# Patient Record
Sex: FEMALE | Race: WHITE | NOT HISPANIC OR LATINO | Employment: UNEMPLOYED
[De-identification: names, ages, dates, MRNs, and addresses within clinical notes are randomized per-mention and may not be internally consistent; named-entity substitution may affect disease eponyms.]

---
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
## 2025-01-31 ENCOUNTER — LAB ENCOUNTER (OUTPATIENT)
Dept: LAB | Age: 20
End: 2025-01-31
Attending: STUDENT IN AN ORGANIZED HEALTH CARE EDUCATION/TRAINING PROGRAM
Payer: COMMERCIAL

## 2025-01-31 ENCOUNTER — OFFICE VISIT (OUTPATIENT)
Dept: INTERNAL MEDICINE CLINIC | Facility: CLINIC | Age: 20
End: 2025-01-31
Payer: COMMERCIAL

## 2025-01-31 VITALS
WEIGHT: 153 LBS | RESPIRATION RATE: 20 BRPM | OXYGEN SATURATION: 97 % | TEMPERATURE: 98 F | HEIGHT: 65 IN | SYSTOLIC BLOOD PRESSURE: 120 MMHG | DIASTOLIC BLOOD PRESSURE: 60 MMHG | HEART RATE: 87 BPM | BODY MASS INDEX: 25.49 KG/M2

## 2025-01-31 DIAGNOSIS — R63.5 WEIGHT GAIN: ICD-10-CM

## 2025-01-31 DIAGNOSIS — E61.2 MAGNESIUM DEFICIENCY: ICD-10-CM

## 2025-01-31 DIAGNOSIS — E61.1 IRON DEFICIENCY: ICD-10-CM

## 2025-01-31 DIAGNOSIS — M79.89 LEG SWELLING: ICD-10-CM

## 2025-01-31 DIAGNOSIS — E55.9 VITAMIN D DEFICIENCY: ICD-10-CM

## 2025-01-31 DIAGNOSIS — E55.9 VITAMIN D DEFICIENCY: Primary | ICD-10-CM

## 2025-01-31 LAB
ALBUMIN SERPL-MCNC: 4.6 G/DL (ref 3.2–4.8)
ALBUMIN/GLOB SERPL: 1.7 {RATIO} (ref 1–2)
ALP LIVER SERPL-CCNC: 71 U/L
ALT SERPL-CCNC: 15 U/L
ANION GAP SERPL CALC-SCNC: 9 MMOL/L (ref 0–18)
AST SERPL-CCNC: 21 U/L (ref ?–34)
BILIRUB SERPL-MCNC: 0.7 MG/DL (ref 0.3–1.2)
BUN BLD-MCNC: 15 MG/DL (ref 9–23)
CALCIUM BLD-MCNC: 9.6 MG/DL (ref 8.7–10.6)
CHLORIDE SERPL-SCNC: 105 MMOL/L (ref 98–112)
CO2 SERPL-SCNC: 26 MMOL/L (ref 21–32)
CREAT BLD-MCNC: 0.87 MG/DL
DEPRECATED HBV CORE AB SER IA-ACNC: 25 NG/ML
EGFRCR SERPLBLD CKD-EPI 2021: 98 ML/MIN/1.73M2 (ref 60–?)
FASTING STATUS PATIENT QL REPORTED: NO
GLOBULIN PLAS-MCNC: 2.7 G/DL (ref 2–3.5)
GLUCOSE BLD-MCNC: 86 MG/DL (ref 70–99)
MAGNESIUM SERPL-MCNC: 2 MG/DL (ref 1.6–2.6)
NT-PROBNP SERPL-MCNC: <35 PG/ML (ref ?–125)
OSMOLALITY SERPL CALC.SUM OF ELEC: 290 MOSM/KG (ref 275–295)
POTASSIUM SERPL-SCNC: 4.3 MMOL/L (ref 3.5–5.1)
PROT SERPL-MCNC: 7.3 G/DL (ref 5.7–8.2)
SODIUM SERPL-SCNC: 140 MMOL/L (ref 136–145)
TSI SER-ACNC: 0.7 UIU/ML (ref 0.48–4.17)
VIT D+METAB SERPL-MCNC: 68.2 NG/ML (ref 30–100)

## 2025-01-31 PROCEDURE — 83735 ASSAY OF MAGNESIUM: CPT

## 2025-01-31 PROCEDURE — 36415 COLL VENOUS BLD VENIPUNCTURE: CPT

## 2025-01-31 PROCEDURE — 82728 ASSAY OF FERRITIN: CPT

## 2025-01-31 PROCEDURE — 83880 ASSAY OF NATRIURETIC PEPTIDE: CPT

## 2025-01-31 PROCEDURE — G2211 COMPLEX E/M VISIT ADD ON: HCPCS | Performed by: STUDENT IN AN ORGANIZED HEALTH CARE EDUCATION/TRAINING PROGRAM

## 2025-01-31 PROCEDURE — 3008F BODY MASS INDEX DOCD: CPT | Performed by: STUDENT IN AN ORGANIZED HEALTH CARE EDUCATION/TRAINING PROGRAM

## 2025-01-31 PROCEDURE — 3074F SYST BP LT 130 MM HG: CPT | Performed by: STUDENT IN AN ORGANIZED HEALTH CARE EDUCATION/TRAINING PROGRAM

## 2025-01-31 PROCEDURE — 99214 OFFICE O/P EST MOD 30 MIN: CPT | Performed by: STUDENT IN AN ORGANIZED HEALTH CARE EDUCATION/TRAINING PROGRAM

## 2025-01-31 PROCEDURE — 80053 COMPREHEN METABOLIC PANEL: CPT

## 2025-01-31 PROCEDURE — 82306 VITAMIN D 25 HYDROXY: CPT

## 2025-01-31 PROCEDURE — 84443 ASSAY THYROID STIM HORMONE: CPT

## 2025-01-31 PROCEDURE — 3078F DIAST BP <80 MM HG: CPT | Performed by: STUDENT IN AN ORGANIZED HEALTH CARE EDUCATION/TRAINING PROGRAM
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
## 2025-02-03 ENCOUNTER — PATIENT MESSAGE (OUTPATIENT)
Dept: INTERNAL MEDICINE CLINIC | Facility: CLINIC | Age: 20
End: 2025-02-03

## 2025-02-03 DIAGNOSIS — D50.9 IRON DEFICIENCY ANEMIA, UNSPECIFIED IRON DEFICIENCY ANEMIA TYPE: Primary | ICD-10-CM

## 2025-02-03 RX ORDER — FERROUS SULFATE 325(65) MG
325 TABLET, DELAYED RELEASE (ENTERIC COATED) ORAL
COMMUNITY

## 2025-03-18 ENCOUNTER — OFFICE VISIT (OUTPATIENT)
Facility: LOCATION | Age: 20
End: 2025-03-18
Payer: COMMERCIAL

## 2025-03-18 VITALS
BODY MASS INDEX: 26.49 KG/M2 | HEIGHT: 65 IN | SYSTOLIC BLOOD PRESSURE: 102 MMHG | DIASTOLIC BLOOD PRESSURE: 60 MMHG | WEIGHT: 159 LBS

## 2025-03-18 DIAGNOSIS — R14.0 BLOATING: Primary | ICD-10-CM

## 2025-03-18 DIAGNOSIS — R79.89 LOW TSH LEVEL: ICD-10-CM

## 2025-03-18 DIAGNOSIS — E07.9 THYROID DISEASE: ICD-10-CM

## 2025-03-18 DIAGNOSIS — R63.5 WEIGHT GAIN: ICD-10-CM

## 2025-03-18 DIAGNOSIS — L30.9 ECZEMA, UNSPECIFIED TYPE: ICD-10-CM

## 2025-03-18 DIAGNOSIS — R41.89 BRAIN FOG: ICD-10-CM

## 2025-03-18 PROCEDURE — 3074F SYST BP LT 130 MM HG: CPT | Performed by: INTERNAL MEDICINE

## 2025-03-18 PROCEDURE — 99214 OFFICE O/P EST MOD 30 MIN: CPT | Performed by: INTERNAL MEDICINE

## 2025-03-18 PROCEDURE — 3078F DIAST BP <80 MM HG: CPT | Performed by: INTERNAL MEDICINE

## 2025-03-18 PROCEDURE — 3008F BODY MASS INDEX DOCD: CPT | Performed by: INTERNAL MEDICINE

## 2025-03-18 NOTE — PATIENT INSTRUCTIONS
8-9 AM labs   24 hr urine collection   How to Collect the 24-hour Urine Specimen  Decide on a day to do the test.  On the day of the test, patient empty bladder (urinate or pee) in the toilet right after waking up. Flush the urine down the toilet.  The test begins now with the bladder empty. Write this date and the start time on the storage container’s label.  For the next 24 hours, patient will need to pee into a collection container every time they go to the bathroom. Females can use a toilet hat. Males can use a plastic urinal or pee right into the large storage container. If you do not have a toilet hat or urinal at home, you may use some other clean plastic container- or get them from labs   Before using the plastic container for the first time, wash it with dish soap and then rinse at least 10 times with tap water. Allow it to air dry completely.  Do not let feces (poop) mix with the urine or else the test will need to be restarted.  Pour the urine into the large storage container and close the lid tightly. Be very careful not to spill any urine.  If using a collection container, rinse it with water only. Put it back by the toilet to remind you to use it the next time. Allow it to air dry completely.  Put the large storage container in the refrigerator ( or in put ice around the container and place in larger container). The urine must be kept cool at all times. If you do not have space in the refrigerator, you can store it in a cooler on top of Add more ice as needed to keep the urine cold.  Each time patient pees during the day and night, follow steps to collect urine.  The next day (close to the same time that you started on the first day), patient needs to pee into the collection container one last time. Add it to the large storage container. This ends the 24-hour collection.  Write the date and time of this last urine collection on the label.  Attach a list of all medicines, including over-the-counter  medicines, vitamins and herbal remedies, patient took during the 24-hour urine collection.    Take the urine to a Laboratory (Lab) Service Center as soon as possible, within 24 hours after ending the collection. Keep the urine cool.  Make sure the urine does not freeze for these tests: amylase, arylsulfatase, immunoelectrophoresis, micro-albumin, pregnanetriol, protein or uric acid.  You will need to start the test over if any of the urine spilled, you forgot to save some or it has poop in it. If you must restart the test, it is okay to use the same collection and storage containers. Pour out the urine, clean the containers well and allow them to air dry. Then follow

## 2025-03-18 NOTE — PROGRESS NOTES
Reason for Visit:    low tsh  Requesting Physician:   ..Sabi Marcus MD    CHIEF COMPLAINT:    Chief Complaint   Patient presents with    Other     Weigh gain        HISTORY OF PRESENT ILLNESS:   Priya Corona is a 19 year old female who presents with  low TSH   She feels more swelling in face, stomach and legs   Stopped olanzapine in Dec 2024 . She gained wt after that.   Wt Readings from Last 6 Encounters:   03/18/25 159 lb (72.1 kg) (87%, Z= 1.13)*   01/31/25 153 lb (69.4 kg) (83%, Z= 0.97)*   10/08/24 120 lb 2.4 oz (54.5 kg) (37%, Z= -0.32)*   08/22/24 121 lb (54.9 kg) (40%, Z= -0.26)*   05/09/24 129 lb 12.8 oz (58.9 kg) (58%, Z= 0.21)*   05/07/24 128 lb (58.1 kg) (55%, Z= 0.13)*     * Growth percentiles are based on CDC (Girls, 2-20 Years) data.     Had sore throat last week   No change in BM     In the last ~ 2 weeks she saw 7 doctors. 2 ER, 3 PCP, ENT, GYN and will see neuro      Has EBV   No skin chagnes   LMP 2/2025 , she is on not on OCP       No umm   Reviewed her outside w/u   Low TSH, low FT3, normal FT4, neg TPO   Alk phos is low   CT normal adrenal.        01/31/25 13:16   TSH  0.701     ASSESSMENT AND PLAN:  19 year old  female who presents with  low TSH  Clinically , nonspecific symptoms - bloating and wt gain   She is concerned she has high cortisol.   She asked about ozempic.   We discussed med side effect    Normal Vit D and TSH on recent labs. We discussed with pt.     Plan   8-9 AM labs   24 hr urine collection   How to Collect the 24-hour Urine Specimen  Decide on a day to do the test.  On the day of the test, patient empty bladder (urinate or pee) in the toilet right after waking up. Flush the urine down the toilet.  The test begins now with the bladder empty. Write this date and the start time on the storage container’s label.  For the next 24 hours, patient will need to pee into a collection container every time they go to the bathroom. Females can use a toilet hat. Males can use a  plastic urinal or pee right into the large storage container. If you do not have a toilet hat or urinal at home, you may use some other clean plastic container- or get them from labs   Before using the plastic container for the first time, wash it with dish soap and then rinse at least 10 times with tap water. Allow it to air dry completely.  Do not let feces (poop) mix with the urine or else the test will need to be restarted.  Pour the urine into the large storage container and close the lid tightly. Be very careful not to spill any urine.  If using a collection container, rinse it with water only. Put it back by the toilet to remind you to use it the next time. Allow it to air dry completely.  Put the large storage container in the refrigerator ( or in put ice around the container and place in larger container). The urine must be kept cool at all times. If you do not have space in the refrigerator, you can store it in a cooler on top of Add more ice as needed to keep the urine cold.  Each time patient pees during the day and night, follow steps to collect urine.  The next day (close to the same time that you started on the first day), patient needs to pee into the collection container one last time. Add it to the large storage container. This ends the 24-hour collection.  Write the date and time of this last urine collection on the label.  Attach a list of all medicines, including over-the-counter medicines, vitamins and herbal remedies, patient took during the 24-hour urine collection.    Take the urine to a Laboratory (Lab) Service Center as soon as possible, within 24 hours after ending the collection. Keep the urine cool.  Make sure the urine does not freeze for these tests: amylase, arylsulfatase, immunoelectrophoresis, micro-albumin, pregnanetriol, protein or uric acid.  You will need to start the test over if any of the urine spilled, you forgot to save some or it has poop in it. If you must restart the  test, it is okay to use the same collection and storage containers. Pour out the urine, clean the containers well and allow them to air dry. Then follow           PAST MEDICAL HISTORY:   Past Medical History:    Allergic rhinitis    Cervical lymphadenopathy    Eczema    Eczema    Fever of unknown origin    Hyperglycemia    Hyponatremia    Mononucleosis    Pancytopenia (HCC)    Rash    Syncope and collapse    Thrombocytopenia       PAST SURGICAL HISTORY:   Past Surgical History:   Procedure Laterality Date    Adenoidectomy      Other surgical history      R arm fracture setting at age 3        CURRENT MEDICATIONS:    No outpatient medications have been marked as taking for the 3/18/25 encounter (Office Visit) with Pranav Mcknight MD.       ALLERGIES:  Allergies   Allergen Reactions    Bactrim [Sulfamethoxazole W/Trimethoprim] RASH, FEVER and OTHER (SEE COMMENTS)     Bone marrow suppression     Amoxicillin RASH    Haldol [Haloperidol] OTHER (SEE COMMENTS)     Severe EPS     Penicillins RASH       SOCIAL HISTORY:    Social History     Socioeconomic History    Marital status: Single   Tobacco Use    Smoking status: Never     Passive exposure: Never    Smokeless tobacco: Never   Vaping Use    Vaping status: Never Used   Substance and Sexual Activity    Alcohol use: Yes     Comment: socially, parent aware    Drug use: Yes     Types: Cannabis   Other Topics Concern    Caffeine Concern Yes     Comment: coffee occ    Exercise Yes     [employer] tech student   Smoking no   Marijuana no  Etoh occ  Drugs no    FAMILY HISTORY:   Family History   Problem Relation Age of Onset    Hypertension Mother     Hypertension Maternal Grandmother     Hypertension Maternal Grandfather     Diabetes Paternal Grandmother    M aunt with breast cancer   GM with pancreatic ca   Hypothyroid in family         PHYSICAL EXAM:   Height: 5' 5\" (165.1 cm) (03/18 1002)  Weight: 159 lb (72.1 kg) (03/18 1002)  BSA (Calculated - sq m): 1.79 sq meters (03/18  1002)  Pulse: --  BP: 102/60 (03/18 1002)  Temp: --  Do Not Use - Resp Rate: --  SpO2: --    Body mass index is 26.46 kg/m².  Wt Readings from Last 6 Encounters:   03/18/25 159 lb (72.1 kg) (87%, Z= 1.13)*   01/31/25 153 lb (69.4 kg) (83%, Z= 0.97)*   10/08/24 120 lb 2.4 oz (54.5 kg) (37%, Z= -0.32)*   08/22/24 121 lb (54.9 kg) (40%, Z= -0.26)*   05/09/24 129 lb 12.8 oz (58.9 kg) (58%, Z= 0.21)*   05/07/24 128 lb (58.1 kg) (55%, Z= 0.13)*     * Growth percentiles are based on [facility] (Girls, 2-20 Years) data.     No tremors   No goiter         DATA:     Pertinent data reviewed   CT 2024 ADRENALS:  No mass or enlargement.    Latest Reference Range & Units 05/03/24 13:34 05/06/24 14:45   T4,Free (Direct) 0.9 - 1.6 ng/dL 1.2    TSH 0.358 - 3.740 mIU/mL 0.341 (L)    T3 FREE 2.40 - 4.20 pg/mL 2.15 (L)    ANTI-THYROPEROXIDASE <60 U/mL  42   (L): Data is abnormally low       No results for input(s): \"TSH\", \"T4F\", \"T3F\", \"THYP\" in the last 72 hours.  No results found.    Orders Placed This Encounter   Procedures    Iron and TIBC    Vitamin B12    Folic Acid Serum (Folate)    Celiac Disease Screen Reflex    Connective Tissue Disease (TIA) Screen, Reflex Specific Antibody    Thyroid peroxidase & thyroglobulin ab    TSH and Free T4    Cortisol    Creatinine and Cortisol, Urine     Orders Placed This Encounter    Iron and TIBC     Standing Status:   Future     Standing Expiration Date:   3/18/2026     Order Specific Question:   Release to patient     Answer:   Immediate    Vitamin B12     Standing Status:   Future     Standing Expiration Date:   3/18/2026    Folic Acid Serum (Folate)     Standing Status:   Future     Standing Expiration Date:   3/18/2026     Order Specific Question:   Release to patient     Answer:   Immediate    Celiac Disease Screen Reflex     Standing Status:   Future     Standing Expiration Date:   3/18/2026     Order Specific Question:   Release to patient     Answer:   Immediate    Connective Tissue Disease  (TIA) Screen, Reflex Specific Antibody     Standing Status:   Future     Standing Expiration Date:   3/18/2026     Order Specific Question:   Release to patient     Answer:   Immediate    Thyroid peroxidase & thyroglobulin ab     Order Specific Question:   Release to patient     Answer:   Immediate    TSH and Free T4     Order Specific Question:   Release to patient     Answer:   Immediate    Cortisol     Standing Status:   Future     Standing Expiration Date:   3/18/2026     Order Specific Question:   Release to patient     Answer:   Immediate    Creatinine and Cortisol, Urine     Standing Status:   Future     Standing Expiration Date:   3/18/2026     Order Specific Question:   Release to patient     Answer:   Immediate          This is a specialized patient consultation in endocrinology and required comprehensive review of prior records, as well as current evaluation, with time required for consideration of complex endocrine issues and consultation. For this visit, I personally interviewed the patient, and family member if accompanied, performed the pertinent parts of the history and physical examination. ROS included screening for appropriate endocrine conditions.   Today's diagnosis and plan were reviewed in detail with the patient who states understanding and agrees with plan. I discussed with the patient possible diagnosis, differential diagnosis, need for work up, treatment options, alternatives and side effects.     Please see note for details about time spent which includes:   · pre-visit preparation  · reviewing records  · face to face time with the patient   · timely documentation of the encounter  · ordering medications/tests  · communication with care team  · care coordination    I appreciate the opportunity to be part of your patient's medical care and will keep you, as the referring and primary physicians, informed about the care of your patient. Please feel free to contact me should you have any  questions.    The 21st Century Cures Act makes medical notes like these available to patients in the interest of transparency. Please be advised this is a medical document. Medical documents are intended to carry relevant information, facts as evident, and the clinical opinion of the practitioner. The medical note is intended as peer to peer communication and may appear blunt or direct. It is written in medical language and may contain abbreviations or verbiage that are unfamiliar.   Pranav Mcknight MD

## 2025-03-21 ENCOUNTER — LAB ENCOUNTER (OUTPATIENT)
Dept: LAB | Age: 20
End: 2025-03-21
Attending: INTERNAL MEDICINE
Payer: COMMERCIAL

## 2025-03-21 DIAGNOSIS — L30.9 ECZEMA, UNSPECIFIED TYPE: ICD-10-CM

## 2025-03-21 DIAGNOSIS — R14.0 BLOATING: ICD-10-CM

## 2025-03-21 DIAGNOSIS — R41.89 BRAIN FOG: ICD-10-CM

## 2025-03-21 DIAGNOSIS — R63.5 WEIGHT GAIN: ICD-10-CM

## 2025-03-21 DIAGNOSIS — E07.9 THYROID DISEASE: ICD-10-CM

## 2025-03-21 DIAGNOSIS — R79.89 LOW TSH LEVEL: ICD-10-CM

## 2025-03-21 LAB
CORTIS SERPL-MCNC: 16.2 UG/DL
FOLATE SERPL-MCNC: 10 NG/ML (ref 5.4–?)
IGA SERPL-MCNC: 195.9 MG/DL (ref 70–312)
IRON SATN MFR SERPL: 25 %
IRON SERPL-MCNC: 86 UG/DL
T4 FREE SERPL-MCNC: 1.4 NG/DL (ref 0.9–1.6)
THYROGLOB SERPL-MCNC: <15 U/ML (ref ?–60)
THYROPEROXIDASE AB SERPL-ACNC: 33 U/ML (ref ?–60)
TOTAL IRON BINDING CAPACITY: 347 UG/DL (ref 250–425)
TRANSFERRIN SERPL-MCNC: 270 MG/DL (ref 250–380)
TSI SER-ACNC: 0.61 UIU/ML (ref 0.48–4.17)
VIT B12 SERPL-MCNC: 452 PG/ML (ref 211–911)

## 2025-03-21 PROCEDURE — 86038 ANTINUCLEAR ANTIBODIES: CPT

## 2025-03-21 PROCEDURE — 82533 TOTAL CORTISOL: CPT

## 2025-03-21 PROCEDURE — 86225 DNA ANTIBODY NATIVE: CPT

## 2025-03-21 PROCEDURE — 83540 ASSAY OF IRON: CPT

## 2025-03-21 PROCEDURE — 36415 COLL VENOUS BLD VENIPUNCTURE: CPT | Performed by: INTERNAL MEDICINE

## 2025-03-21 PROCEDURE — 86364 TISS TRNSGLTMNASE EA IG CLAS: CPT

## 2025-03-21 PROCEDURE — 84443 ASSAY THYROID STIM HORMONE: CPT | Performed by: INTERNAL MEDICINE

## 2025-03-21 PROCEDURE — 86376 MICROSOMAL ANTIBODY EACH: CPT | Performed by: INTERNAL MEDICINE

## 2025-03-21 PROCEDURE — 86800 THYROGLOBULIN ANTIBODY: CPT | Performed by: INTERNAL MEDICINE

## 2025-03-21 PROCEDURE — 82784 ASSAY IGA/IGD/IGG/IGM EACH: CPT

## 2025-03-21 PROCEDURE — 84439 ASSAY OF FREE THYROXINE: CPT | Performed by: INTERNAL MEDICINE

## 2025-03-21 PROCEDURE — 83550 IRON BINDING TEST: CPT

## 2025-03-21 PROCEDURE — 82607 VITAMIN B-12: CPT

## 2025-03-21 PROCEDURE — 82746 ASSAY OF FOLIC ACID SERUM: CPT

## 2025-03-24 LAB
DSDNA IGG SERPL IA-ACNC: 1.4 IU/ML
ENA AB SER QL IA: 0.2 UG/L
ENA AB SER QL IA: NEGATIVE
TTG IGA SER-ACNC: 0.6 U/ML (ref ?–7)

## 2025-03-26 ENCOUNTER — PATIENT MESSAGE (OUTPATIENT)
Facility: LOCATION | Age: 20
End: 2025-03-26

## 2025-03-26 DIAGNOSIS — R63.5 WEIGHT GAIN: ICD-10-CM

## 2025-03-26 DIAGNOSIS — E07.9 THYROID DISEASE: Primary | ICD-10-CM

## 2025-03-26 DIAGNOSIS — R79.89 LOW TSH LEVEL: ICD-10-CM

## 2025-03-26 DIAGNOSIS — R41.89 BRAIN FOG: ICD-10-CM

## 2025-03-26 DIAGNOSIS — L30.9 ECZEMA, UNSPECIFIED TYPE: ICD-10-CM

## 2025-03-26 NOTE — TELEPHONE ENCOUNTER
Please call pt or her mother   Olanzapine can cause hyperglycemia and metabolic changes. I do not manage or give olanzapine so I cannot recommend any medication adjustment/ changes but we can manage endocrine related problems if patient get high glucose, or high cholesterol.  She can discuss more with mental health doctor who can switch olanzapine if indicated.  I can order labs to repeat A1c, lipid, fasting glucose also       Lab Results   Component Value Date    A1C 5.3 10/06/2024      Cholesterol: 414, done on 10/7/2024.  HDL Cholesterol: 71, done on 10/7/2024.  LDL Cholesterol: 338, done on 10/7/2024.  TriGlycerides 58, done on 10/7/2024.  There is no height or weight on file to calculate BMI.       10/11/24 06:20 01/31/25 13:16   Glucose  85 86        Body mass index is 26.46 kg/m².      Wt Readings from Last 6 Encounters:   03/18/25 159 lb (72.1 kg) (87%, Z= 1.13)*   01/31/25 153 lb (69.4 kg) (83%, Z= 0.97)*   10/08/24 120 lb 2.4 oz (54.5 kg) (37%, Z= -0.32)*   08/22/24 121 lb (54.9 kg) (40%, Z= -0.26)*   05/09/24 129 lb 12.8 oz (58.9 kg) (58%, Z= 0.21)*   05/07/24 128 lb (58.1 kg) (55%, Z= 0.13)*     Thanks

## 2025-03-26 NOTE — TELEPHONE ENCOUNTER
Dr. Juan Luis GILL, patient's mother states patient is no longer on olanzapine and will not be adding anymore psych meds.

## 2025-03-26 NOTE — TELEPHONE ENCOUNTER
Left voicemail on patient mother's voicemail that RN will send Dr. Mcknight's response via [email or web address]. [email or web address] message sent.

## 2025-03-31 ENCOUNTER — LAB ENCOUNTER (OUTPATIENT)
Dept: LAB | Age: 20
End: 2025-03-31
Attending: INTERNAL MEDICINE
Payer: COMMERCIAL

## 2025-03-31 DIAGNOSIS — R63.5 WEIGHT GAIN: ICD-10-CM

## 2025-03-31 DIAGNOSIS — R41.89 BRAIN FOG: ICD-10-CM

## 2025-03-31 DIAGNOSIS — R79.89 LOW TSH LEVEL: ICD-10-CM

## 2025-03-31 DIAGNOSIS — L30.9 ECZEMA, UNSPECIFIED TYPE: ICD-10-CM

## 2025-03-31 DIAGNOSIS — E07.9 THYROID DISEASE: ICD-10-CM

## 2025-03-31 LAB
ALBUMIN SERPL-MCNC: 4.4 G/DL (ref 3.2–4.8)
ALBUMIN/GLOB SERPL: 1.8 {RATIO} (ref 1–2)
ALP LIVER SERPL-CCNC: 68 U/L
ALT SERPL-CCNC: 17 U/L
ANION GAP SERPL CALC-SCNC: 12 MMOL/L (ref 0–18)
AST SERPL-CCNC: 20 U/L (ref ?–34)
BILIRUB SERPL-MCNC: 0.4 MG/DL (ref 0.3–1.2)
BUN BLD-MCNC: 15 MG/DL (ref 9–23)
CALCIUM BLD-MCNC: 9.1 MG/DL (ref 8.7–10.6)
CHLORIDE SERPL-SCNC: 107 MMOL/L (ref 98–112)
CHOLEST SERPL-MCNC: 176 MG/DL (ref ?–200)
CO2 SERPL-SCNC: 22 MMOL/L (ref 21–32)
CREAT BLD-MCNC: 0.69 MG/DL
EGFRCR SERPLBLD CKD-EPI 2021: 128 ML/MIN/1.73M2 (ref 60–?)
EST. AVERAGE GLUCOSE BLD GHB EST-MCNC: 105 MG/DL (ref 68–126)
FASTING PATIENT LIPID ANSWER: YES
FASTING STATUS PATIENT QL REPORTED: YES
GLOBULIN PLAS-MCNC: 2.4 G/DL (ref 2–3.5)
GLUCOSE BLD-MCNC: 82 MG/DL (ref 70–99)
HBA1C MFR BLD: 5.3 % (ref ?–5.7)
HDLC SERPL-MCNC: 92 MG/DL (ref 40–59)
LDLC SERPL CALC-MCNC: 73 MG/DL (ref ?–100)
NONHDLC SERPL-MCNC: 84 MG/DL (ref ?–130)
OSMOLALITY SERPL CALC.SUM OF ELEC: 292 MOSM/KG (ref 275–295)
POTASSIUM SERPL-SCNC: 4 MMOL/L (ref 3.5–5.1)
PROT SERPL-MCNC: 6.8 G/DL (ref 5.7–8.2)
SODIUM SERPL-SCNC: 141 MMOL/L (ref 136–145)
TRIGL SERPL-MCNC: 53 MG/DL (ref 30–149)
VLDLC SERPL CALC-MCNC: 8 MG/DL (ref 0–30)

## 2025-03-31 PROCEDURE — 80053 COMPREHEN METABOLIC PANEL: CPT

## 2025-03-31 PROCEDURE — 80061 LIPID PANEL: CPT

## 2025-03-31 PROCEDURE — 36415 COLL VENOUS BLD VENIPUNCTURE: CPT

## 2025-03-31 PROCEDURE — 83036 HEMOGLOBIN GLYCOSYLATED A1C: CPT

## 2025-04-01 NOTE — TELEPHONE ENCOUNTER
Patient's mom called requesting referral for allergist.    Please advise # [phone]
Was seen back in March by allergy.
Primary

## 2025-04-17 ENCOUNTER — LAB ENCOUNTER (OUTPATIENT)
Dept: LAB | Age: 20
End: 2025-04-17
Attending: INTERNAL MEDICINE
Payer: COMMERCIAL

## 2025-04-17 DIAGNOSIS — R63.5 WEIGHT GAIN: ICD-10-CM

## 2025-04-17 DIAGNOSIS — L30.9 ECZEMA, UNSPECIFIED TYPE: ICD-10-CM

## 2025-04-17 DIAGNOSIS — R41.89 BRAIN FOG: ICD-10-CM

## 2025-04-17 DIAGNOSIS — R79.89 LOW TSH LEVEL: ICD-10-CM

## 2025-04-17 DIAGNOSIS — E07.9 THYROID DISEASE: ICD-10-CM

## 2025-04-17 DIAGNOSIS — R14.0 BLOATING: ICD-10-CM

## 2025-04-17 PROCEDURE — 82530 CORTISOL FREE: CPT

## 2025-04-27 LAB
CORTISOL FREE 24H UR: 24 UG/24 HR
CORTISOL FREE UR: 16 UG/L
CREAT 24H UR: 1443 MG/24 HR
CREAT UR: 96.2 MG/DL

## 2025-05-02 ENCOUNTER — PATIENT MESSAGE (OUTPATIENT)
Dept: INTERNAL MEDICINE CLINIC | Facility: CLINIC | Age: 20
End: 2025-05-02

## 2025-05-02 DIAGNOSIS — Z12.83 SCREENING FOR MALIGNANT NEOPLASM OF SKIN: ICD-10-CM

## 2025-05-02 DIAGNOSIS — L30.9 ECZEMA, UNSPECIFIED TYPE: Primary | ICD-10-CM

## 2025-05-05 NOTE — TELEPHONE ENCOUNTER
Agree with referrals ordered. Recommend Allergist for food related allergy testing as he would be best able to interpret and treat any issues.

## 2025-05-05 NOTE — TELEPHONE ENCOUNTER
Please advise on the referral .   Pt notified through [email or web address]   Offered appointment with you to help with eczema for now as she waits to get in with allergist.

## 2025-05-07 ENCOUNTER — HOSPITAL ENCOUNTER (EMERGENCY)
Facility: HOSPITAL | Age: 20
Discharge: HOME | End: 2025-05-08
Attending: PEDIATRICS
Payer: COMMERCIAL

## 2025-05-07 DIAGNOSIS — R10.9 ABDOMINAL PAIN, ACUTE: Primary | ICD-10-CM

## 2025-05-07 DIAGNOSIS — R19.7 DIARRHEA OF PRESUMED INFECTIOUS ORIGIN: ICD-10-CM

## 2025-05-07 LAB
ALBUMIN SERPL-MCNC: 5.1 G/DL (ref 3.2–4.8)
ALBUMIN/GLOB SERPL: 1.8 {RATIO} (ref 1–2)
ALP LIVER SERPL-CCNC: 64 U/L (ref 52–144)
ALT SERPL-CCNC: 20 U/L (ref 10–49)
ANION GAP SERPL CALC-SCNC: 8 MMOL/L (ref 0–18)
AST SERPL-CCNC: 26 U/L (ref ?–34)
B-HCG UR QL: NEGATIVE
BASOPHILS # BLD AUTO: 0.03 X10(3) UL (ref 0–0.2)
BASOPHILS NFR BLD AUTO: 0.3 %
BILIRUB SERPL-MCNC: 0.5 MG/DL (ref 0.3–1.2)
BILIRUB UR QL STRIP.AUTO: NEGATIVE
BUN BLD-MCNC: 9 MG/DL (ref 9–23)
CALCIUM BLD-MCNC: 10.1 MG/DL (ref 8.7–10.6)
CHLORIDE SERPL-SCNC: 106 MMOL/L (ref 98–112)
CLARITY UR REFRACT.AUTO: CLEAR
CO2 SERPL-SCNC: 24 MMOL/L (ref 21–32)
COLOR UR AUTO: COLORLESS
CREAT BLD-MCNC: 0.73 MG/DL (ref 0.55–1.02)
EGFRCR SERPLBLD CKD-EPI 2021: 121 ML/MIN/1.73M2 (ref 60–?)
EOSINOPHIL # BLD AUTO: 0.19 X10(3) UL (ref 0–0.7)
EOSINOPHIL NFR BLD AUTO: 1.9 %
ERYTHROCYTE [DISTWIDTH] IN BLOOD BY AUTOMATED COUNT: 11.1 %
GLOBULIN PLAS-MCNC: 2.9 G/DL (ref 2–3.5)
GLUCOSE BLD-MCNC: 107 MG/DL (ref 70–99)
GLUCOSE UR STRIP.AUTO-MCNC: NORMAL MG/DL
HCT VFR BLD AUTO: 38.8 % (ref 35–48)
HGB BLD-MCNC: 13.9 G/DL (ref 12–16)
IMM GRANULOCYTES # BLD AUTO: 0.03 X10(3) UL (ref 0–1)
IMM GRANULOCYTES NFR BLD: 0.3 %
KETONES UR STRIP.AUTO-MCNC: NEGATIVE MG/DL
LEUKOCYTE ESTERASE UR QL STRIP.AUTO: NEGATIVE
LIPASE SERPL-CCNC: 41 U/L (ref 12–53)
LYMPHOCYTES # BLD AUTO: 1.64 X10(3) UL (ref 1.5–5)
LYMPHOCYTES NFR BLD AUTO: 16 %
MCH RBC QN AUTO: 30.2 PG (ref 26–34)
MCHC RBC AUTO-ENTMCNC: 35.8 G/DL (ref 31–37)
MCV RBC AUTO: 84.2 FL (ref 80–100)
MONOCYTES # BLD AUTO: 0.77 X10(3) UL (ref 0.1–1)
MONOCYTES NFR BLD AUTO: 7.5 %
NEUTROPHILS # BLD AUTO: 7.57 X10 (3) UL (ref 1.5–7.7)
NEUTROPHILS # BLD AUTO: 7.57 X10(3) UL (ref 1.5–7.7)
NEUTROPHILS NFR BLD AUTO: 74 %
NITRITE UR QL STRIP.AUTO: NEGATIVE
OSMOLALITY SERPL CALC.SUM OF ELEC: 285 MOSM/KG (ref 275–295)
PH UR STRIP.AUTO: 7 [PH] (ref 5–8)
PLATELET # BLD AUTO: 285 10(3)UL (ref 150–450)
POTASSIUM SERPL-SCNC: 4.2 MMOL/L (ref 3.5–5.1)
PROT SERPL-MCNC: 8 G/DL (ref 5.7–8.2)
PROT UR STRIP.AUTO-MCNC: NEGATIVE MG/DL
RBC # BLD AUTO: 4.61 X10(6)UL (ref 3.8–5.3)
RBC UR QL AUTO: NEGATIVE
SODIUM SERPL-SCNC: 138 MMOL/L (ref 136–145)
SP GR UR STRIP.AUTO: 1.01 (ref 1–1.03)
UROBILINOGEN UR STRIP.AUTO-MCNC: NORMAL MG/DL
WBC # BLD AUTO: 10.2 X10(3) UL (ref 4–11)

## 2025-05-07 PROCEDURE — 81003 URINALYSIS AUTO W/O SCOPE: CPT

## 2025-05-07 PROCEDURE — 85025 COMPLETE CBC W/AUTO DIFF WBC: CPT | Performed by: PEDIATRICS

## 2025-05-07 PROCEDURE — 99284 EMERGENCY DEPT VISIT MOD MDM: CPT

## 2025-05-07 PROCEDURE — 80053 COMPREHEN METABOLIC PANEL: CPT | Performed by: PEDIATRICS

## 2025-05-07 PROCEDURE — 96374 THER/PROPH/DIAG INJ IV PUSH: CPT

## 2025-05-07 PROCEDURE — 81025 URINE PREGNANCY TEST: CPT

## 2025-05-07 PROCEDURE — 81003 URINALYSIS AUTO W/O SCOPE: CPT | Performed by: PEDIATRICS

## 2025-05-07 PROCEDURE — 96361 HYDRATE IV INFUSION ADD-ON: CPT

## 2025-05-07 PROCEDURE — 83690 ASSAY OF LIPASE: CPT | Performed by: PEDIATRICS

## 2025-05-07 RX ORDER — ONDANSETRON 2 MG/ML
4 INJECTION INTRAMUSCULAR; INTRAVENOUS ONCE
Status: COMPLETED | OUTPATIENT
Start: 2025-05-07 | End: 2025-05-07

## 2025-05-07 RX ORDER — CLINDAMYCIN HYDROCHLORIDE 150 MG/1
150 CAPSULE ORAL 3 TIMES DAILY
COMMUNITY

## 2025-05-08 VITALS
RESPIRATION RATE: 19 BRPM | HEART RATE: 115 BPM | WEIGHT: 166.88 LBS | SYSTOLIC BLOOD PRESSURE: 121 MMHG | DIASTOLIC BLOOD PRESSURE: 82 MMHG | OXYGEN SATURATION: 100 % | TEMPERATURE: 99 F | BODY MASS INDEX: 28 KG/M2

## 2025-05-08 NOTE — ED INITIAL ASSESSMENT (HPI)
Pt to ER ambulatory with steady gait. Pt reports severe abdominal pain and fever since today.   Denies taking medication for fever/pain.  +Nausea, denies vomiting. Loose stools.  Pt on abx, had wisdom teeth pulled on Friday.

## 2025-05-08 NOTE — ED PROVIDER NOTES
Patient Seen in: [facility] Emergency Department      History     Chief Complaint   Patient presents with    Abdominal Pain    Fever     Stated Complaint: Had wisdom teeth pulled on Friday, on abx, now c/o fever and abd pain, also sta*    Subjective:   HPI    19-year-old female here with abdominal pain and diarrhea that started several hours ago.  Did have fever at home over 102.  No URI symptoms.  Positive nausea without vomiting.  Has been on clindamycin over the last 5 days for wisdom teeth removal.  Denies any oral pain or sore throat  History of Present Illness               Objective:     Past Medical History:    Allergic rhinitis    Cervical lymphadenopathy    Eczema    Eczema    Fever of unknown origin    Hyperglycemia    Hyponatremia    Mononucleosis    Pancytopenia (HCC)    Rash    Syncope and collapse    Thrombocytopenia              Past Surgical History:   Procedure Laterality Date    Adenoidectomy      Other surgical history      R arm fracture setting at age 3                 Social History     Socioeconomic History    Marital status: Single   Tobacco Use    Smoking status: Never     Passive exposure: Never    Smokeless tobacco: Never   Vaping Use    Vaping status: Never Used   Substance and Sexual Activity    Alcohol use: Yes     Comment: socially, parent aware    Drug use: Yes     Types: Cannabis   Other Topics Concern    Caffeine Concern Yes     Comment: coffee occ    Exercise Yes     Social Drivers of Health     Food Insecurity: No Food Insecurity (10/11/2024)    Food Insecurity     Food Insecurity: Never true   Transportation Needs: No Transportation Needs (10/11/2024)    Transportation Needs     Lack of Transportation: No   Housing Stability: Low Risk  (10/11/2024)    Housing Stability     Housing Instability: No                                Physical Exam     ED Triage Vitals [05/07/25 2118]   /72   Pulse 107   Resp 18   Temp 98.7 °F (37.1 °C)   Temp src Oral   SpO2 98 %   O2  Device None (Room air)       Current Vitals:   Vital Signs  BP: 121/82  Pulse: 115  Resp: 19  Temp: 98.7 °F (37.1 °C)  Temp src: Oral  MAP (mmHg): 93    Oxygen Therapy  SpO2: 100 %  O2 Device: None (Room air)        Physical Exam  Vitals and nursing note reviewed.   Constitutional:       General: She is not in acute distress.     Appearance: Normal appearance. She is well-developed. She is not ill-appearing, toxic-appearing or diaphoretic.   HENT:      Head: Normocephalic and atraumatic.      Right Ear: External ear normal.      Left Ear: External ear normal.      Nose: Nose normal. No congestion or rhinorrhea.      Mouth/Throat:      Mouth: Mucous membranes are moist.   Eyes:      General: No scleral icterus.        Right eye: No discharge.         Left eye: No discharge.      Extraocular Movements: Extraocular movements intact.      Conjunctiva/sclera: Conjunctivae normal.      Pupils: Pupils are equal, round, and reactive to light.   Neck:      Thyroid: No thyromegaly.      Vascular: No JVD.      Trachea: No tracheal deviation.   Cardiovascular:      Rate and Rhythm: Normal rate and regular rhythm.      Heart sounds: Normal heart sounds. No murmur heard.     No friction rub. No gallop.   Pulmonary:      Effort: Pulmonary effort is normal. No respiratory distress.      Breath sounds: Normal breath sounds. No stridor. No wheezing, rhonchi or rales.   Chest:      Chest wall: No tenderness.   Abdominal:      General: Bowel sounds are normal. There is no distension.      Palpations: Abdomen is soft. There is no mass.      Tenderness: There is abdominal tenderness. There is no right CVA tenderness, left CVA tenderness, guarding or rebound.      Comments: Diffuse abdominal tenderness without focal right lower quadrant tenderness.  No peritoneal signs.   Musculoskeletal:         General: No swelling or tenderness. Normal range of motion.      Cervical back: Normal range of motion and neck supple. No rigidity or  tenderness.   Lymphadenopathy:      Cervical: No cervical adenopathy.   Skin:     General: Skin is warm.      Capillary Refill: Capillary refill takes less than 2 seconds.      Coloration: Skin is not jaundiced or pale.      Findings: No bruising, erythema, lesion or rash.   Neurological:      General: No focal deficit present.      Mental Status: She is alert and oriented to person, place, and time. Mental status is at baseline.      Cranial Nerves: No cranial nerve deficit.      Motor: No abnormal muscle tone.      Coordination: Coordination normal.   Psychiatric:         Behavior: Behavior normal.         Thought Content: Thought content normal.         Judgment: Judgment normal.         Physical Exam                ED Course     Labs Reviewed   URINALYSIS WITH CULTURE REFLEX - Abnormal; Notable for the following components:       Result Value    Urine Color Colorless (*)     All other components within normal limits   COMP METABOLIC PANEL (14) - Abnormal; Notable for the following components:    Glucose 107 (*)     Albumin 5.1 (*)     All other components within normal limits   LIPASE - Normal   POCT PREGNANCY URINE - Normal   CBC WITH DIFFERENTIAL WITH PLATELET   C. DIFFICILE(TOXIGENIC)PCR   GI STOOL PANEL BY PCR          Results            Labs:  Personally reviewed any labs ordered.    Medications administered:  Medications   sodium chloride 0.9 % IV bolus 1,000 mL (0 mL Intravenous Stopped 5/7/25 2318)   ondansetron (Zofran) 4 MG/2ML injection 4 mg (4 mg Intravenous Given 5/7/25 2218)       Pulse oximetry:  Pulse oximetry on room air is 98% and is normal.     Cardiac Monitoring:  Initial heart rate is 107 and is normal for age    Vital Signs:  Vitals:    05/07/25 2118 05/07/25 2230 05/08/25 0000   BP: 105/72 121/82    Pulse: 107 94 115   Resp: 18 19    Temp: 98.7 °F (37.1 °C)     TempSrc: Oral     SpO2: 98% 100% 100%   Weight: 75.7 kg       Chart review:  Epic chart review was performed and all relevant PCP  or ED visits, as well as hospitalizations, were assessed for relevance to this particular visit.   Review of non-ED visits reviewed: noted in history                  MDM      Assessment & Plan:    19 year old female with abdominal pain and diarrhea tonight.  Also had fever at home.  On exam, overall well-appearing however slightly tachycardic.  Benign abdominal exam.  IV placed and given a fluid bolus and Zofran.  Labs reassuring.  Overall did have improvement.  Did vomit later in ED course and felt subsequently much improved.  Likely viral gastroenteritis.  Motrin or Tylenol as needed.        ^^ Independent historian: parent  ^^ Prescription drug and OTC medication management considerations: as noted above      Patient or caregiver understands the course of events that occurred in the emergency department. Instructed to return to emergency department or contact PCP for persistent, recurrent, or worsening symptoms.    This report has been produced using speech recognition software and may contain errors related to that system including, but not limited to, errors in grammar, punctuation, and spelling, as well as words and phrases that possibly may have been recognized inappropriately.  If there are any questions or concerns, contact the dictating provider for clarification.     NOTE: The 21st Century Cares Act makes medical notes available to patients.  Be advised that this is a medical document written in medical language and may contain abbreviations or verbiage that is unfamiliar or direct.  It is primarily intended to carry relevant historical information, physical exam findings, and the clinical assessment of the physician.         Medical Decision Making  Problems Addressed:  Abdominal pain, acute: acute illness or injury with systemic symptoms that poses a threat to life or bodily functions  Diarrhea of presumed infectious origin: acute illness or injury with systemic symptoms    Amount and/or Complexity of  Data Reviewed  Independent Historian: parent  Labs: ordered. Decision-making details documented in ED Course.    Risk  OTC drugs.        Disposition and Plan     Clinical Impression:  1. Abdominal pain, acute    2. Diarrhea of presumed infectious origin         Disposition:  Discharge  5/8/2025 12:16 am    Follow-up:  [facility] Emergency Department  [address] [place] [id number]  [phone]  Follow up  As needed, if symptoms worsen          Medications Prescribed:  Current Discharge Medication List          Supplementary Documentation:

## 2025-05-14 ENCOUNTER — PATIENT OUTREACH (OUTPATIENT)
Dept: CASE MANAGEMENT | Age: 20
End: 2025-05-14

## 2025-05-14 NOTE — PROGRESS NOTES
Patient had recent Emergency Room visit, calling to offer Primary Care Physician follow-up appointment (discharged 05/07)    Dr Marcela Marcus  Internal Medicine  [address] [place] [id number]  [phone]    Attempt #1:  Left message on voicemail for patient to call transitions specialist back to schedule follow up appointments. Provided Transitions specialist scheduling phone number [phone].

## 2025-05-15 NOTE — PROGRESS NOTES
Hospital Follow up for PCP (Discharge 5/8 edw)     PCP  Marcela Marcus  Internal Medicine  [address] [facility]. [address] [address]  [phone]  Unable to contact pt after muiltple attempts   Attempt #2: in the middle of leaving vm line disconnected   Closing encounter

## 2025-05-15 NOTE — PROGRESS NOTES
Hospital Follow up for PCP (Discharge 5/8 edw)     PCP  Marcela Marcus  Internal Medicine  [facility] [address] [address]  [phone]    Attempt #2: in the middle of leaving vm line disconnected   Closing encounter

## 2025-06-06 ENCOUNTER — LAB ENCOUNTER (OUTPATIENT)
Dept: LAB | Age: 20
End: 2025-06-06
Attending: STUDENT IN AN ORGANIZED HEALTH CARE EDUCATION/TRAINING PROGRAM
Payer: COMMERCIAL

## 2025-06-06 DIAGNOSIS — D50.9 IRON DEFICIENCY ANEMIA, UNSPECIFIED IRON DEFICIENCY ANEMIA TYPE: ICD-10-CM

## 2025-06-06 DIAGNOSIS — E07.9 THYROID DISEASE: ICD-10-CM

## 2025-06-06 DIAGNOSIS — L30.9 ECZEMA, UNSPECIFIED TYPE: ICD-10-CM

## 2025-06-06 DIAGNOSIS — R63.5 WEIGHT GAIN: ICD-10-CM

## 2025-06-06 DIAGNOSIS — F29 PSYCHOSIS, UNSPECIFIED PSYCHOSIS TYPE (HCC): ICD-10-CM

## 2025-06-06 DIAGNOSIS — R79.89 LOW TSH LEVEL: ICD-10-CM

## 2025-06-06 DIAGNOSIS — R41.89 BRAIN FOG: ICD-10-CM

## 2025-06-06 LAB
BASOPHILS # BLD AUTO: 0.03 X10(3) UL (ref 0–0.2)
BASOPHILS NFR BLD AUTO: 0.8 %
DEPRECATED HBV CORE AB SER IA-ACNC: 20 NG/ML (ref 50–306)
EOSINOPHIL # BLD AUTO: 0.16 X10(3) UL (ref 0–0.7)
EOSINOPHIL NFR BLD AUTO: 4.1 %
ERYTHROCYTE [DISTWIDTH] IN BLOOD BY AUTOMATED COUNT: 11.9 %
HCT VFR BLD AUTO: 38.5 % (ref 35–48)
HGB BLD-MCNC: 13.3 G/DL (ref 12–16)
IMM GRANULOCYTES # BLD AUTO: 0.01 X10(3) UL (ref 0–1)
IMM GRANULOCYTES NFR BLD: 0.3 %
IRON SATN MFR SERPL: 29 % (ref 15–50)
IRON SERPL-MCNC: 100 UG/DL (ref 50–170)
LYMPHOCYTES # BLD AUTO: 1.62 X10(3) UL (ref 1.5–5)
LYMPHOCYTES NFR BLD AUTO: 41.3 %
MCH RBC QN AUTO: 29.6 PG (ref 26–34)
MCHC RBC AUTO-ENTMCNC: 34.5 G/DL (ref 31–37)
MCV RBC AUTO: 85.7 FL (ref 80–100)
MONOCYTES # BLD AUTO: 0.49 X10(3) UL (ref 0.1–1)
MONOCYTES NFR BLD AUTO: 12.5 %
NEUTROPHILS # BLD AUTO: 1.61 X10 (3) UL (ref 1.5–7.7)
NEUTROPHILS # BLD AUTO: 1.61 X10(3) UL (ref 1.5–7.7)
NEUTROPHILS NFR BLD AUTO: 41 %
PLATELET # BLD AUTO: 276 10(3)UL (ref 150–450)
RBC # BLD AUTO: 4.49 X10(6)UL (ref 3.8–5.3)
T4 FREE SERPL-MCNC: 1.4 NG/DL (ref 0.9–1.6)
TOTAL IRON BINDING CAPACITY: 350 UG/DL (ref 250–425)
TRANSFERRIN SERPL-MCNC: 276 MG/DL (ref 250–380)
TSI SER-ACNC: 0.69 UIU/ML (ref 0.48–4.17)
WBC # BLD AUTO: 3.9 X10(3) UL (ref 4–11)

## 2025-06-06 PROCEDURE — 86596 VOLTAGE-GTD CA CHNL ANTB EA: CPT

## 2025-06-06 PROCEDURE — 84439 ASSAY OF FREE THYROXINE: CPT

## 2025-06-06 PROCEDURE — 82728 ASSAY OF FERRITIN: CPT

## 2025-06-06 PROCEDURE — 86255 FLUORESCENT ANTIBODY SCREEN: CPT

## 2025-06-06 PROCEDURE — 83550 IRON BINDING TEST: CPT

## 2025-06-06 PROCEDURE — 36415 COLL VENOUS BLD VENIPUNCTURE: CPT

## 2025-06-06 PROCEDURE — 84443 ASSAY THYROID STIM HORMONE: CPT

## 2025-06-06 PROCEDURE — 83540 ASSAY OF IRON: CPT

## 2025-06-06 PROCEDURE — 85025 COMPLETE CBC W/AUTO DIFF WBC: CPT

## 2025-06-06 PROCEDURE — 86341 ISLET CELL ANTIBODY: CPT

## 2025-06-06 PROCEDURE — 86051 AQUAPORIN-4 ANTB ELISA: CPT

## 2025-06-22 LAB
AGNA-1: NEGATIVE
AMPA-R1 ANTIBODY: NEGATIVE
AMPA-R2 ANTIBODY: NEGATIVE
AMPHIPHYSIN ANTIBODY: NEGATIVE
ANTI-HU AB: NEGATIVE
ANTI-RI AB: NEGATIVE
ANTI-YO AB: NEGATIVE
ANTINERUONAL NUCLEAR AB TYPE 3: NEGATIVE
AQUAPORIN 4 ANTIBODY: NEGATIVE
CASPR2 ANTIBODY,CELL-BASED IFA: NEGATIVE
CRMP-5 IGG: NEGATIVE
DNER ANTIBODY: NEGATIVE
DPPX ANTIBODY: NEGATIVE
GABA-B-R ANTIBODY: NEGATIVE
GAD65 ANTIBODY: NEGATIVE
IGLON5 ANTIBODY: NEGATIVE
INTERPRETATION: NEGATIVE
ITPR1 ANTIBODY: NEGATIVE
LGI1 ANTIBODY, CELL-BASED IFA: NEGATIVE
MA2/TA ANTIBODY: NEGATIVE
MGLUR1 ANTIBODY: NEGATIVE
NMDA-R ANTIBODY: NEGATIVE
PURKINJE CELL CYTO AB TYPE 2: NEGATIVE
PURKINJE CELL CYTO AB TYPE TR: NEGATIVE
VGCC ANTIBODY: <1 PMOL/L
ZIC4 ANTIBODY: NEGATIVE

## 2025-07-04 ENCOUNTER — APPOINTMENT (OUTPATIENT)
Dept: GENERAL RADIOLOGY | Age: 20
End: 2025-07-04
Payer: COMMERCIAL

## 2025-07-04 ENCOUNTER — HOSPITAL ENCOUNTER (EMERGENCY)
Age: 20
Discharge: HOME | End: 2025-07-04
Payer: COMMERCIAL

## 2025-07-04 VITALS
HEIGHT: 65 IN | WEIGHT: 158 LBS | SYSTOLIC BLOOD PRESSURE: 92 MMHG | DIASTOLIC BLOOD PRESSURE: 65 MMHG | OXYGEN SATURATION: 99 % | RESPIRATION RATE: 20 BRPM | HEART RATE: 101 BPM | BODY MASS INDEX: 26.33 KG/M2 | TEMPERATURE: 98 F

## 2025-07-04 DIAGNOSIS — S92.415A CLOSED NONDISPLACED FRACTURE OF PROXIMAL PHALANX OF LEFT GREAT TOE, INITIAL ENCOUNTER: Primary | ICD-10-CM

## 2025-07-04 PROCEDURE — 73630 X-RAY EXAM OF FOOT: CPT

## 2025-07-04 PROCEDURE — 99284 EMERGENCY DEPT VISIT MOD MDM: CPT

## 2025-07-04 PROCEDURE — 28490 TREAT BIG TOE FRACTURE: CPT

## 2025-07-04 PROCEDURE — 99283 EMERGENCY DEPT VISIT LOW MDM: CPT

## 2025-07-04 RX ORDER — IBUPROFEN 600 MG/1
600 TABLET, FILM COATED ORAL ONCE
Status: COMPLETED | OUTPATIENT
Start: 2025-07-04 | End: 2025-07-04

## 2025-07-04 NOTE — ED PROVIDER NOTES
Patient Seen in: ward Emergency Department In [place]        History  Chief Complaint   Patient presents with    Leg or Foot Injury     Stated Complaint: hurt left big toe playing volleyball. swelling and superpainful.    Subjective:   HPI    19-year-old female with no significant past medical history presents to the ER for left great toe injury.  States she was playing volleyball in her dress when she excellently tripped and fell backwards bending her left big toe.  Denies any injuries elsewhere.  No numbness.      Objective:     Past Medical History:    Allergic rhinitis    Cervical lymphadenopathy    Eczema    Eczema    Fever of unknown origin    Hyperglycemia    Hyponatremia    Mononucleosis    Pancytopenia (HCC)    Rash    Syncope and collapse    Thrombocytopenia              Past Surgical History:   Procedure Laterality Date    Adenoidectomy      Other surgical history      R arm fracture setting at age 3                 Social History     Socioeconomic History    Marital status: Single   Tobacco Use    Smoking status: Never     Passive exposure: Never    Smokeless tobacco: Never   Vaping Use    Vaping status: Never Used   Substance and Sexual Activity    Alcohol use: Yes     Comment: socially, parent aware    Drug use: Not Currently     Types: Cannabis   Other Topics Concern    Caffeine Concern Yes     Comment: coffee occ    Exercise Yes     Social Drivers of Health     Food Insecurity: No Food Insecurity (10/11/2024)    Food Insecurity     Food Insecurity: Never true   Transportation Needs: No Transportation Needs (10/11/2024)    Transportation Needs     Lack of Transportation: No   Housing Stability: Low Risk  (10/11/2024)    Housing Stability     Housing Instability: No                                Physical Exam    ED Triage Vitals [07/04/25 1728]   BP 92/65   Pulse 101   Resp 20   Temp 98.2 °F (36.8 °C)   Temp src Oral   SpO2 99 %   O2 Device None (Room air)       Current Vitals:   Vital  Signs  BP: 92/65  Pulse: 101  Resp: 20  Temp: 98.2 °F (36.8 °C)  Temp src: Oral    Oxygen Therapy  SpO2: 99 %  O2 Device: None (Room air)            Physical Exam  GENERAL APPEARANCE:  AxOx4, generally well-appearing, no acute distress.  HEENT:  NC, AT.   NECK:  Supple without lymphadenopathy.  No stiffness or restricted ROM.  RESPIRATORY: Normal rate, no respiratory distress.  EXTREMITIES: Tenderness at the most proximal aspect of the left great toe with limited range of motion secondary to pain, mild early ecchymosis noted without any specific swelling.  Neurovascularly intact distally with no tenderness elsewhere in the left lower extremity.  NEUROLOGICAL:  Grossly nonfocal. Observed to ambulate with normal gait.  Skin: Normal color and no visible rashes.         ED Course  Labs Reviewed - No data to display       XR FOOT, COMPLETE (MIN 3 VIEWS), LEFT (CPT=73630)  Result Date: 7/4/2025  PROCEDURE: XR FOOT, COMPLETE (MIN 3 VIEWS), LEFT (CPT=73630) INDICATIONS: hurt left big toe playing volleyball. swelling and superpainful. COMPARISON: There are no comparisons for this exam. FINDINGS: Bones: On the lateral view there is a tiny possible bony fragment in the interphalangeal joint could represent a tiny chip or avulsion fracture. Soft Tissues: There is no significant soft tissue swelling. There are no abnormal soft tissue calcifications.     CONCLUSION: Possible tiny chip or avulsion fracture only seen on the lateral view in the region of the first toe interphalangeal joint. Electronically Verified and Signed by Attending Radiologist: Cristiano Mitchell MD 7/4/2025 6:21 PM Workstation: [id number]              [facility]     19-year-old female who presents to the ER for left lower extremity injury.  Vitals with slight tachycardia 101 but otherwise normal.  No other external signs of trauma warrant elsewhere to warrant further emergent workup.  X-ray imaging reveals small avulsion fracture.  Plan to place in a postop shoe with  crutches and follow-up with orthopedist.  Discussed care at home and return precautions.  Ready for discharge.        Medical Decision Making      Disposition and Plan     Clinical Impression:  1. Closed nondisplaced fracture of proximal phalanx of left great toe, initial encounter         Disposition:  Discharge  7/4/2025  6:33 pm    Follow-up:  Shakira Altamirano, DPM  [address] [place] [address]  [phone]    Follow up            Medications Prescribed:  Discharge Medication List as of 7/4/2025  6:38 PM                Supplementary Documentation:

## 2025-07-04 NOTE — DISCHARGE INSTRUCTIONS
Call to make an appointment with orthopedist for follow-up.  Use postop shoe, crutches, gentle weightbearing as tolerated until cleared by orthopedic podiatrist.  Take Tylenol or ibuprofen for pain.  Rest, ice and elevate foot is much as possible.  Return to the ER for any new or worsening symptoms.

## 2025-07-08 ENCOUNTER — PATIENT OUTREACH (OUTPATIENT)
Dept: CASE MANAGEMENT | Age: 20
End: 2025-07-08

## 2025-07-08 NOTE — PROGRESS NOTES
ED Hospital Follow up for PCP (Discharge 7/4 edw)       PCP  Sabi Marcus  [address] [address]  [phone]  Attempt #1:  Left message on voicemail for patient to call transitions specialist back to schedule follow up appointments. Provided Transitions specialist scheduling phone number [phone].

## 2025-07-09 NOTE — PROGRESS NOTES
ED Hospital Follow up for PCP (Discharge 7/4 edw)       PCP  Sabi Marcus  [place] [address] [address]  [phone]  Unable to contact pt after multiple attempts  Attempt #2:  Left message on voicemail for patient to call transitions specialist back to schedule follow up appointments. Provided Transitions specialist scheduling phone number [phone]. Closing encounter. Will re-open if patient returns call.

## 2025-07-16 ENCOUNTER — LAB ENCOUNTER (OUTPATIENT)
Dept: LAB | Age: 20
End: 2025-07-16
Attending: ALLERGY & IMMUNOLOGY
Payer: COMMERCIAL

## 2025-07-16 ENCOUNTER — OFFICE VISIT (OUTPATIENT)
Dept: ALLERGY | Facility: CLINIC | Age: 20
End: 2025-07-16
Payer: COMMERCIAL

## 2025-07-16 DIAGNOSIS — L20.89 OTHER ATOPIC DERMATITIS: ICD-10-CM

## 2025-07-16 DIAGNOSIS — Z91.018 FOOD ALLERGY: ICD-10-CM

## 2025-07-16 DIAGNOSIS — Z23 NEED FOR COVID-19 VACCINE: ICD-10-CM

## 2025-07-16 DIAGNOSIS — Z23 FLU VACCINE NEED: ICD-10-CM

## 2025-07-16 DIAGNOSIS — Z91.018 FOOD ALLERGY: Primary | ICD-10-CM

## 2025-07-16 PROCEDURE — 82785 ASSAY OF IGE: CPT | Performed by: ALLERGY & IMMUNOLOGY

## 2025-07-16 PROCEDURE — 86003 ALLG SPEC IGE CRUDE XTRC EA: CPT

## 2025-07-16 PROCEDURE — 36415 COLL VENOUS BLD VENIPUNCTURE: CPT

## 2025-07-16 PROCEDURE — 99204 OFFICE O/P NEW MOD 45 MIN: CPT | Performed by: ALLERGY & IMMUNOLOGY

## 2025-07-16 PROCEDURE — 86003 ALLG SPEC IGE CRUDE XTRC EA: CPT | Performed by: ALLERGY & IMMUNOLOGY

## 2025-07-16 NOTE — PROGRESS NOTES
The following individual(s) verbally consented to be recorded using ambient AI listening technology and understand that they can each withdraw their consent to this listening technology at any point by asking the clinician to turn off or pause the recording:    Patient name: Priya Corona  Additional names:  Janee (mother)

## 2025-07-16 NOTE — PATIENT INSTRUCTIONS
Atopic dermatitis  Chronic atopic dermatitis since toddlerhood with dry, scaly, and inflamed skin, experiencing exacerbations and remissions. Previous treatments include moisturizers, topical steroids, Protopic, Elidel, and Dupixent. Dupixent was effective for two years but lost efficacy last year, leading to facial and body flare-ups. Current management with fragrance-free moisturizers is insufficient. Topical steroids are avoided due to adverse reactions. Environmental allergens, particularly dust mites, may exacerbate symptoms. Food allergies are being considered, though she may not directly worsen eczema. Allergy shots previously exacerbated symptoms and are not recommended.  - Order blood test for allergy zone A panel and adult food allergy profile, including beef.  - Refer to dermatologist for potential phototherapy and evaluation of newer medications like DARIAN kinase inhibitors, which require monitoring for liver enzymes and for potential infections.  - Avoid topical steroids per patient's request  Patient reports previous trials of steroid free topicals including Protopic Elidel Eucrisa  - Continue using fragrance-free moisturizers.  - Avoid allergy shots due to previous adverse reactions.  Moisturizers include Taylor Cetaphil CeraVe Vanicream  Clear and free detergents  Dove is a soap or Cetaphil as a cleanser    #2 environmental allergies.   Check serum IgE panel to environmental allergens to evaluate for allergic triggers.  Patient reports previous trial of immunotherapy a few years ago through Dr. Lara at [facility].  Patient feels no improvement with immunotherapy and defers considering at this time.    #3 flu vaccine in the fall recommended    4.  COVID-vaccine booster to be considered

## 2025-07-16 NOTE — PROGRESS NOTES
Priya Corona is a 19 year old female.    HPI:     Chief Complaint   Patient presents with    Eczema     Pt here for worsening eczema. She is concerned that certain foods are causing it to worsen. Unable to pinpoint which foods may be worsening it. Denies taking any antihistamines in the last 5 days.      Patient is a 19-year-old female who presents for allergy consultation upon referral of her PCP, Dr. Marcus with a chief complaint of concern for allergies.  Patient for visit notes concern for potential food triggers as well as eczematous dermatitis.  Review of records notes history of amoxicillin and penicillin allergy.  Prior note for visit with PCP from May 5, 2025 recommended allergy and dermatology evaluation  Immunizations reviewed      Today patient reports    History of Present Illness   Some xerosis.  No active erythema noted.Priya Corona is a 19 year old female with eczema who presents with concerns about food allergies and eczema management. She was referred by Dr. Pennington for dermatology consultation.    She has experienced eczema since toddlerhood, with symptoms such as dryness, scaliness, and inflammation that vary weekly. Symptoms improve during the summer due to UV exposure. She has tried various treatments including moisturizers without fragrances, topical steroids, Protopic, Elidel, and Dupixent. Dupixent was effective for two years but stopped working last summer, leading to a recurrence of eczema on her face and body. Discontinuation of Dupixent led to improvement on her face but persistent eczema on her body.    She is concerned about potential food allergies contributing to her eczema. She notes possible reactions to certain flavored steaks but is unsure of specific triggers. She has undergone skin testing previously and prefers blood testing for further evaluation. She has a history of environmental allergies, including grass, mold, trees, dogs, cats, and cockroaches, identified  through past testing.    She has previously tried allergy shots, which worsened her eczema, leading to the initiation of Dupixent treatment. She is not interested in pursuing allergy shots again.    No history of vitiligo or other pigmentation loss. No history of asthma reported. No acute reactions to foods within two hours of ingestion, except for possible reactions to flavored steak.         HISTORY:  Past Medical History[1]   Past Surgical History[2]   Family History[3]   Social History: Short Social Hx on File[4]     Medications (Active prior to today's visit):  Current Medications[5]    Allergies:  Allergies[6]      ROS:     Allergic/Immuno:  See HPI  Cardiovascular:  Negative for irregular heartbeat/palpitations, chest pain, edema  Constitutional:  Negative night sweats,weight loss, irritability and lethargy  Endocrine:  Negative for cold intolerance, polydipsia and polyphagia  ENMT:  Negative for ear drainage, hearing loss and nasal drainage  Eyes:  Negative for eye discharge and vision loss  Gastrointestinal:  Negative for abdominal pain, diarrhea and vomiting  Genitourinary:  Negative for dysuria and hematuria  Hema/Lymph:  Negative for easy bleeding and easy bruising  Integumentary:  see hpi   Musculoskeletal:  Negative for joint symptoms  Neurological:  Negative for dizziness, seizures  Psychiatric:  Negative for inappropriate interaction and psychiatric symptoms  Respiratory:  Negative for cough, dyspnea and wheezing      PHYSICAL EXAM:   Constitutional: responsive, no acute distress noted  Head/Face: NC/Atraumatic  Eyes/Vision: conjunctiva and lids are normal extraocular motion is intact   Ears/Audiometry: tympanic membranes are normal bilaterally hearing is grossly intact  Nose/Mouth/Throat: nose and throat are clear mucous membranes are moist   Neck/Thyroid: neck is supple without adenopathy  Lymphatic: no abnormal cervical, supraclavicular or axillary adenopathy is noted  Respiratory: normal to  inspection lungs are clear to auscultation bilaterally normal respiratory effort   Cardiovascular: regular rate and rhythm no murmurs, gallups, or rubs  Abdomen: soft non-tender non-distended  Skin/Hair: Areas of hypopigmentation in the antecubital fossa proximal distal humerus area scattered areas on her arms and legs as well.   Extremities: no edema, cyanosis, or clubbing  Neurological:Oriented to time, place, person & situation       ASSESSMENT/PLAN:   Assessment   Encounter Diagnoses   Name Primary?    Food allergy Yes    Other atopic dermatitis     Flu vaccine need     Need for COVID-19 vaccine      Patient and parent defer skin testing at this time and lieu of serum IgE testing  Assessment & Plan  Atopic dermatitis  Chronic atopic dermatitis since toddlerhood with dry, scaly, and inflamed skin, experiencing exacerbations and remissions. Previous treatments include moisturizers, topical steroids, Protopic, Elidel, and Dupixent. Dupixent was effective for two years but lost efficacy last year, leading to facial and body flare-ups. Current management with fragrance-free moisturizers is insufficient. Topical steroids are avoided due to adverse reactions. Environmental allergens, particularly dust mites, may exacerbate symptoms. Food allergies are being considered, though she may not directly worsen eczema. Allergy shots previously exacerbated symptoms and are not recommended.  - Order blood test for allergy zone A panel and adult food allergy profile, including beef.  - Refer to dermatologist for potential phototherapy and evaluation of newer medications like DARIAN kinase inhibitors, which require monitoring for liver enzymes and for potential infections.  - Avoid topical steroids per patient's request  Patient reports previous trials of steroid free topicals including Protopic Elidel Eucrisa  - Continue using fragrance-free moisturizers.  - Avoid allergy shots due to previous adverse reactions.  Moisturizers  include Taylor Cetaphil CeraVe Vanicream  Clear and free detergents  Dove is a soap or Cetaphil as a cleanser  Reviewed [place] 31 inhibitor has been approved for atopic dermatitis as well.    #2 environmental allergies.   Check serum IgE panel to environmental allergens to evaluate for allergic triggers.  Patient reports previous trial of immunotherapy a few years ago through Dr. Lara at [facility].  Patient feels no improvement with immunotherapy and defers considering at this time.    #3 flu vaccine in the fall recommended    4.  COVID-vaccine booster to be considered            Orders This Visit:  No orders of the defined types were placed in this encounter.      Meds This Visit:  Requested Prescriptions      No prescriptions requested or ordered in this encounter       Imaging & Referrals:  None     7/16/2025  Juancho Mccollum MD      If medication samples were provided today, they were provided solely for patient education and training related to self administration of these medications.  Teaching, instruction and sample was provided to the patient by myself.  Teaching included  a review of potential adverse side effects as well as potential efficacy.  Patient's questions were answered in regards to medication administration and dosing and potential side effects. Teaching was provided via the teach back method         [1]   Past Medical History:   Allergic rhinitis    Cervical lymphadenopathy    Eczema    Eczema    Fever of unknown origin    Hyperglycemia    Hyponatremia    Mononucleosis    Pancytopenia (HCC)    Rash    Syncope and collapse    Thrombocytopenia   [2]   Past Surgical History:  Procedure Laterality Date    Adenoidectomy      Other surgical history      R arm fracture setting at age 3     [place] teeth removed     [3]   Family History  Problem Relation Age of Onset    Hypertension Mother     Hypertension Maternal Grandmother     Hypertension Maternal Grandfather     Diabetes Paternal Grandmother    [4]    Social History  Socioeconomic History    Marital status: Single   Tobacco Use    Smoking status: Never     Passive exposure: Never    Smokeless tobacco: Former    Tobacco comments:     Pt reports history of vaping.   Vaping Use    Vaping status: Never Used   Substance and Sexual Activity    Alcohol use: Yes     Comment: socially, parent aware    Drug use: Not Currently     Types: Cannabis   Other Topics Concern    Caffeine Concern Yes     Comment: coffee occ    Exercise Yes     Social Drivers of Health     Food Insecurity: No Food Insecurity (10/11/2024)    Food Insecurity     Food Insecurity: Never true   Transportation Needs: No Transportation Needs (10/11/2024)    Transportation Needs     Lack of Transportation: No   Housing Stability: Low Risk  (10/11/2024)    Housing Stability     Housing Instability: No   [5]   Current Outpatient Medications   Medication Sig Dispense Refill    ferrous sulfate 325 (65 FE) MG Oral Tab EC Take 1 tablet (325 mg total) by mouth daily with breakfast.      clindamycin 150 MG Oral Cap Take 1 capsule (150 mg total) by mouth 3 (three) times daily. (Patient not taking: Reported on 7/16/2025)      LORazepam 1 MG Oral Tab Take 1 tablet (1 mg total) by mouth 3 (three) times daily. (Patient not taking: Reported on 7/16/2025) 90 tablet 0   [6]   Allergies  Allergen Reactions    Bactrim [Sulfamethoxazole W/Trimethoprim] RASH, FEVER and OTHER (SEE COMMENTS)     Bone marrow suppression     Amoxicillin RASH    Haldol [Haloperidol] OTHER (SEE COMMENTS)     Severe EPS     Penicillins RASH

## 2025-07-17 LAB
A ALTERNATA IGE QN: 0.38 KUA/L (ref ?–0.1)
A FUMIGATUS IGE QN: <0.1 KUA/L (ref ?–0.1)
AMER SYCAMORE IGE QN: <0.1 KUA/L (ref ?–0.1)
BEEF IGE QN: <0.1 KUA/L (ref ?–0.1)
BERMUDA GRASS IGE QN: <0.1 KUA/L (ref ?–0.1)
BOXELDER IGE QN: <0.1 KUA/L (ref ?–0.1)
C HERBARUM IGE QN: <0.1 KUA/L (ref ?–0.1)
CALIF WALNUT IGE QN: <0.1 KUA/L (ref ?–0.1)
CAT DANDER IGE QN: 1.16 KUA/L (ref ?–0.1)
CMN PIGWEED IGE QN: <0.1 KUA/L (ref ?–0.1)
COMMON RAGWEED IGE QN: 0.26 KUA/L (ref ?–0.1)
COTTONWOOD IGE QN: <0.1 KUA/L (ref ?–0.1)
D FARINAE IGE QN: <0.1 KUA/L (ref ?–0.1)
D PTERONYSS IGE QN: <0.1 KUA/L (ref ?–0.1)
DOG DANDER IGE QN: 1.84 KUA/L (ref ?–0.1)
IGE SERPL-ACNC: 24.1 KU/L (ref 2–214)
M RACEMOSUS IGE QN: <0.1 KUA/L (ref ?–0.1)
MARSH ELDER IGE QN: <0.1 KUA/L (ref ?–0.1)
MOUSE EPITH IGE QN: <0.1 KUA/L (ref ?–0.1)
MT JUNIPER IGE QN: <0.1 KUA/L (ref ?–0.1)
P NOTATUM IGE QN: <0.1 KUA/L (ref ?–0.1)
PECAN/HICK TREE IGE QN: <0.1 KUA/L (ref ?–0.1)
ROACH IGE QN: <0.1 KUA/L (ref ?–0.1)
SALTWORT IGE QN: <0.1 KUA/L (ref ?–0.1)
SILVER BIRCH IGE QN: <0.1 KUA/L (ref ?–0.1)
TIMOTHY IGE QN: 1.14 KUA/L (ref ?–0.1)
WHITE ASH IGE QN: <0.1 KUA/L (ref ?–0.1)
WHITE ELM IGE QN: <0.1 KUA/L (ref ?–0.1)
WHITE MULBERRY IGE QN: <0.1 KUA/L (ref ?–0.1)
WHITE OAK IGE QN: 0.76 KUA/L (ref ?–0.1)

## 2025-07-18 LAB
ALLERGEN BRAZIL NUT: <0.1 KUA/L (ref ?–0.1)
ALMOND IGE QN: <0.1 KUA/L (ref ?–0.1)
CASHEW NUT IGE QN: <0.1 KUA/L (ref ?–0.1)
CLAM IGE QN: <0.1 KUA/L (ref ?–0.1)
CODFISH IGE QN: <0.1 KUA/L (ref ?–0.1)
CORN IGE QN: <0.1 KUA/L (ref ?–0.1)
COW MILK IGE QN: <0.1 KUA/L (ref ?–0.1)
EGG WHITE IGE QN: <0.1 KUA/L (ref ?–0.1)
GLUTEN IGE QN: <0.1 KUA/L (ref ?–0.1)
HAZELNUT IGE QN: <0.1 KUA/L (ref ?–0.1)
IGE SERPL-ACNC: 22.5 KU/L (ref 2–214)
PEANUT IGE QN: <0.1 KUA/L (ref ?–0.1)
SALMON IGE QN: <0.1 KUA/L (ref ?–0.1)
SCALLOP IGE QN: <0.1 KUA/L (ref ?–0.1)
SESAME SEED IGE QN: <0.1 KUA/L (ref ?–0.1)
SHRIMP IGE QN: <0.1 KUA/L (ref ?–0.1)
SOYBEAN IGE QN: <0.1 KUA/L (ref ?–0.1)
WALNUT IGE QN: <0.1 KUA/L (ref ?–0.1)
WHEAT IGE QN: <0.1 KUA/L (ref ?–0.1)

## 2025-07-19 ENCOUNTER — TELEPHONE (OUTPATIENT)
Dept: ALLERGY | Facility: CLINIC | Age: 20
End: 2025-07-19

## 2025-07-23 ENCOUNTER — HOSPITAL ENCOUNTER (OUTPATIENT)
Age: 20
Discharge: HOME | End: 2025-07-23
Payer: COMMERCIAL

## 2025-07-23 VITALS
SYSTOLIC BLOOD PRESSURE: 97 MMHG | OXYGEN SATURATION: 96 % | TEMPERATURE: 99 F | DIASTOLIC BLOOD PRESSURE: 59 MMHG | RESPIRATION RATE: 16 BRPM | HEART RATE: 78 BPM

## 2025-07-23 DIAGNOSIS — R30.0 DYSURIA: Primary | ICD-10-CM

## 2025-07-23 LAB
B-HCG UR QL: NEGATIVE
BILIRUB UR QL STRIP: NEGATIVE
GLUCOSE UR STRIP-MCNC: NEGATIVE MG/DL
KETONES UR STRIP-MCNC: NEGATIVE MG/DL
NITRITE UR QL STRIP: NEGATIVE
PH UR STRIP: 7 [PH]
PROT UR STRIP-MCNC: >=300 MG/DL
SP GR UR STRIP: >=1.03
UROBILINOGEN UR STRIP-ACNC: <2 MG/DL

## 2025-07-23 PROCEDURE — 99214 OFFICE O/P EST MOD 30 MIN: CPT | Performed by: NURSE PRACTITIONER

## 2025-07-23 PROCEDURE — 81002 URINALYSIS NONAUTO W/O SCOPE: CPT | Performed by: NURSE PRACTITIONER

## 2025-07-23 PROCEDURE — 81025 URINE PREGNANCY TEST: CPT | Performed by: NURSE PRACTITIONER

## 2025-07-23 RX ORDER — NITROFURANTOIN 25; 75 MG/1; MG/1
100 CAPSULE ORAL 2 TIMES DAILY
Qty: 10 CAPSULE | Refills: 0 | Status: SHIPPED | OUTPATIENT
Start: 2025-07-23 | End: 2025-07-28

## 2025-07-23 NOTE — ED PROVIDER NOTES
Patient Seen in: [facility]        History  Chief Complaint   Patient presents with    Urinary Symptoms     Stated Complaint: Urinary Symptoms    Subjective:   This is a 19-year-old female with below stated medical history.  Presents to immediate care for dysuria and suprapubic pressure.  Symptoms started this morning.  Denies any abnormal vaginal bleeding or vaginal discharge.  Denies any new sexual partners or concern for STI.  Denies any fevers, vomiting, or flank pain.  No treatment attempted prior to arrival.     The history is provided by the patient.                     Objective:     Past Medical History:    Allergic rhinitis    Cervical lymphadenopathy    Eczema    Eczema    Fever of unknown origin    Hyperglycemia    Hyponatremia    Mononucleosis    Pancytopenia (HCC)    Rash    Syncope and collapse    Thrombocytopenia              Past Surgical History:   Procedure Laterality Date    Adenoidectomy      Other surgical history      R arm fracture setting at age 3     Ord teeth removed                  Social History     Socioeconomic History    Marital status: Single   Tobacco Use    Smoking status: Never     Passive exposure: Never    Smokeless tobacco: Former    Tobacco comments:     Pt reports history of vaping.   Vaping Use    Vaping status: Never Used   Substance and Sexual Activity    Alcohol use: Yes     Comment: socially, parent aware    Drug use: Not Currently     Types: Cannabis   Other Topics Concern    Caffeine Concern Yes     Comment: coffee occ    Exercise Yes     Social Drivers of Health     Food Insecurity: No Food Insecurity (10/11/2024)    Food Insecurity     Food Insecurity: Never true   Transportation Needs: No Transportation Needs (10/11/2024)    Transportation Needs     Lack of Transportation: No   Housing Stability: Low Risk  (10/11/2024)    Housing Stability     Housing Instability: No              Review of Systems   Constitutional:  Negative for chills and fever.    HENT:  Negative for congestion and sore throat.    Respiratory:  Negative for cough.    Cardiovascular:  Negative for chest pain.   Gastrointestinal:  Negative for abdominal pain, diarrhea, nausea and vomiting.   Genitourinary:  Positive for dysuria, pelvic pain and urgency. Negative for flank pain, frequency, hematuria, vaginal bleeding and vaginal discharge.   Musculoskeletal:  Negative for back pain, neck pain and neck stiffness.   Skin:  Negative for rash.   Neurological:  Negative for headaches.       Positive for stated complaint: Urinary Symptoms  Other systems are as noted in HPI.  Constitutional and vital signs reviewed.      All other systems reviewed and negative except as noted above.                  Physical Exam    ED Triage Vitals [07/23/25 1356]   BP 97/59   Pulse 78   Resp 16   Temp 98.8 °F (37.1 °C)   Temp src Oral   SpO2 96 %   O2 Device None (Room air)       Current Vitals:   Vital Signs  BP: 97/59  Pulse: 78  Resp: 16  Temp: 98.8 °F (37.1 °C)  Temp src: Oral    Oxygen Therapy  SpO2: 96 %  O2 Device: None (Room air)            Physical Exam  Vitals and nursing note reviewed.   Constitutional:       General: She is not in acute distress.     Appearance: Normal appearance. She is not ill-appearing, toxic-appearing or diaphoretic.   HENT:      Head: Normocephalic and atraumatic.      Right Ear: External ear normal.      Left Ear: External ear normal.      Nose: Nose normal.      Mouth/Throat:      Mouth: Mucous membranes are moist.      Pharynx: Oropharynx is clear.   Eyes:      General:         Right eye: No discharge.         Left eye: No discharge.      Extraocular Movements: Extraocular movements intact.      Conjunctiva/sclera: Conjunctivae normal.   Cardiovascular:      Rate and Rhythm: Normal rate.   Pulmonary:      Effort: Pulmonary effort is normal.   Abdominal:      General: Bowel sounds are normal. There is no distension.      Palpations: Abdomen is soft. There is no mass.       Tenderness: There is no abdominal tenderness. There is no right CVA tenderness, left CVA tenderness, guarding or rebound.      Hernia: No hernia is present.   Musculoskeletal:      Cervical back: Neck supple.      Right lower leg: No edema.      Left lower leg: No edema.   Skin:     General: Skin is warm and dry.      Capillary Refill: Capillary refill takes less than 2 seconds.      Findings: No rash.   Neurological:      Mental Status: She is alert and oriented to person, place, and time.   Psychiatric:         Mood and Affect: Mood normal.         Behavior: Behavior normal.                 ED Course  Labs Reviewed   [facility] POCT URINALYSIS DIPSTICK - Abnormal; Notable for the following components:       Result Value    Urine Color Tati (*)     Urine Clarity Cloudy (*)     Protein urine >=300 (*)     Blood, Urine Large (*)     Leukocyte esterase urine Trace (*)     All other components within normal limits   POCT PREGNANCY URINE - Normal   URINE CULTURE, ROUTINE          UA/UC, urine preg                  MDM       Vital signs stable.  Patient is well-appearing and nontoxic looking.  Presents to immediate care for UTI symptoms.      Differential diagnosis include but limited to UTI, pyelonephritis, vaginitis, STI, less likely acute abdomen    Reports no fevers, flank pain, abdominal pain, nausea, vomiting, or  diarrhea.  Benign abdominal exam.  No concern for acute abdomen, pyelonephritis, or obstructive uropathy.  States she has no concern for STI.  UA shows large blood and trace leuks.  Urine pregnancy is negative.  Urine was sent for culture.      High concern for UTI.    IL home.  Course of Macrobid prescribed.  Azo over-the-counter as directed.  The importance for hydration discussed.  Follow-up with PCP in 1 week.  Reasons to seek emergent treatment reinforced.  Patient verbalized understanding, and agreed to plan of care.  All questions answered.          Medical Decision Making      Disposition and Plan      Clinical Impression:  1. Dysuria         Disposition:  Discharge  7/23/2025  2:23 pm    Follow-up:  Sabi Marcus MD  [address] [place] [id number]  [phone]    In 1 week  As needed          Medications Prescribed:  There are no discharge medications for this patient.            Supplementary Documentation:

## 2025-07-23 NOTE — DISCHARGE INSTRUCTIONS
We will call you if your urine culture is positive for treatment.  Over-the-counter Azo may be helpful for your symptoms.  If your urine culture is negative please follow closely with your primary care provider as discussed.

## 2025-07-23 NOTE — ED INITIAL ASSESSMENT (HPI)
Patient reports painful urination and pelvic pain that started this morning with concerns for UTI, denies urinary frequency, slight urgency, denies vaginal discharge/complaints

## 2025-07-24 ENCOUNTER — OFFICE VISIT (OUTPATIENT)
Dept: INTERNAL MEDICINE CLINIC | Facility: CLINIC | Age: 20
End: 2025-07-24
Payer: COMMERCIAL

## 2025-07-24 VITALS
BODY MASS INDEX: 26.66 KG/M2 | RESPIRATION RATE: 20 BRPM | OXYGEN SATURATION: 100 % | DIASTOLIC BLOOD PRESSURE: 60 MMHG | TEMPERATURE: 97 F | WEIGHT: 160 LBS | SYSTOLIC BLOOD PRESSURE: 100 MMHG | HEIGHT: 65 IN | HEART RATE: 90 BPM

## 2025-07-24 DIAGNOSIS — J02.9 SORE THROAT: ICD-10-CM

## 2025-07-24 DIAGNOSIS — J02.9 VIRAL PHARYNGITIS: Primary | ICD-10-CM

## 2025-07-24 DIAGNOSIS — N30.00 ACUTE CYSTITIS WITHOUT HEMATURIA: ICD-10-CM

## 2025-07-24 DIAGNOSIS — Z11.3 SCREENING FOR STD (SEXUALLY TRANSMITTED DISEASE): ICD-10-CM

## 2025-07-24 PROBLEM — L20.82 FLEXURAL ECZEMA: Status: ACTIVE | Noted: 2025-07-24

## 2025-07-24 PROBLEM — R41.840 IMPAIRED CONCENTRATION: Status: RESOLVED | Noted: 2024-05-10 | Resolved: 2025-07-24

## 2025-07-24 PROBLEM — T50.905A ADVERSE EFFECT OF DRUG, INITIAL ENCOUNTER: Status: RESOLVED | Noted: 2024-10-06 | Resolved: 2025-07-24

## 2025-07-24 PROBLEM — F41.9 ANXIETY DISORDER: Status: RESOLVED | Noted: 2024-10-08 | Resolved: 2025-07-24

## 2025-07-24 PROBLEM — Z86.59 HISTORY OF PSYCHOSIS: Status: ACTIVE | Noted: 2025-07-24

## 2025-07-24 LAB
CONTROL LINE PRESENT WITH A CLEAR BACKGROUND (YES/NO): YES YES/NO
KIT LOT #: NORMAL NUMERIC

## 2025-07-24 PROCEDURE — 87591 N.GONORRHOEAE DNA AMP PROB: CPT | Performed by: STUDENT IN AN ORGANIZED HEALTH CARE EDUCATION/TRAINING PROGRAM

## 2025-07-24 PROCEDURE — 87491 CHLMYD TRACH DNA AMP PROBE: CPT | Performed by: STUDENT IN AN ORGANIZED HEALTH CARE EDUCATION/TRAINING PROGRAM

## 2025-07-24 NOTE — PATIENT INSTRUCTIONS
VISIT SUMMARY:  During today's visit, we discussed your recent urinary symptoms and sore throat. We also reviewed your history of eczema and mental health. You are currently taking nitrofurantoin for a possible urinary tract infection (UTI) and awaiting further test results. We also addressed your sore throat and provided recommendations for managing your eczema and stress.    YOUR PLAN:  -URINARY TRACT INFECTION (UTI): A UTI is an infection in any part of your urinary system. You are currently taking nitrofurantoin (Macrobid) twice daily. We have ordered a urine culture to confirm the infection and an STD panel to rule out sexually transmitted infections. Please continue taking your medication, stay hydrated, practice good hygiene, and urinate after sexual activity to help prevent future UTIs.    -SORE THROAT: Your sore throat started recently and feels scratchy and painful. It may be due to a viral infection. We are waiting for the results of your strep throat test. In the meantime, you can take Tylenol for pain and use warm or cold compresses for relief.    -ECZEMA: Eczema is a condition that makes your skin red and itchy. Your eczema is currently affecting your eyelids and other areas. We recommend seeing a dermatologist for better management and considering a consultation with an allergist to address any potential allergies that may be contributing to your symptoms. Dr. Mendoza dermatology [phone]     -ACUTE STRESS DISORDER: Acute stress disorder is a mental health condition that can occur after a traumatic event. You are currently in therapy and have shown improvement. It is important to continue therapy to manage stress and discuss your condition with your therapist to better understand and manage future stressors.    INSTRUCTIONS:  Please continue taking nitrofurantoin (Macrobid) as prescribed. We have ordered a urine culture and an STD panel, so please follow up for those results. If your strep throat  test comes back positive, we will adjust your antibiotics accordingly. Schedule an appointment with a dermatologist for your eczema and consider seeing an allergist. Continue your therapy sessions and discuss your mental health condition with your therapist. Follow up with us as needed.    Contains text generated by Brigette
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
## 2025-07-25 ENCOUNTER — LAB ENCOUNTER (OUTPATIENT)
Dept: LAB | Age: 20
End: 2025-07-25
Attending: STUDENT IN AN ORGANIZED HEALTH CARE EDUCATION/TRAINING PROGRAM
Payer: COMMERCIAL

## 2025-07-25 DIAGNOSIS — Z11.3 SCREENING FOR STD (SEXUALLY TRANSMITTED DISEASE): ICD-10-CM

## 2025-07-25 LAB
C TRACH DNA SPEC QL NAA+PROBE: NEGATIVE
HBV SURFACE AB SER QL: REACTIVE
HBV SURFACE AB SERPL IA-ACNC: 69.21 MIU/ML
HBV SURFACE AG SER-ACNC: 0.12
HBV SURFACE AG SERPL QL IA: NONREACTIVE
HCV AB SERPL QL IA: NONREACTIVE
N GONORRHOEA DNA SPEC QL NAA+PROBE: NEGATIVE
T PALLIDUM AB SER QL IA: NONREACTIVE

## 2025-07-25 PROCEDURE — 86780 TREPONEMA PALLIDUM: CPT

## 2025-07-25 PROCEDURE — 86803 HEPATITIS C AB TEST: CPT

## 2025-07-25 PROCEDURE — 87389 HIV-1 AG W/HIV-1&-2 AB AG IA: CPT

## 2025-07-25 PROCEDURE — 87340 HEPATITIS B SURFACE AG IA: CPT

## 2025-07-25 PROCEDURE — 36415 COLL VENOUS BLD VENIPUNCTURE: CPT

## 2025-07-25 PROCEDURE — 86706 HEP B SURFACE ANTIBODY: CPT

## 2025-07-25 NOTE — ED NOTES
Pt called and left vm to call ic back. Phone number provided. Pt should stop antibx urine culture is probable contamination cont to monitor and follow up with pcp if sx persists.

## 2025-07-26 NOTE — ED NOTES
Called and left a message to have patient call back to discuss negative urine culture. Phone number provided.

## 2025-07-26 NOTE — ED NOTES
Incoming call from patient regarding urine culture result.  Patient instructed to stop taking prescribed antibiotic and follow up with her PCP for ongoing concerns.  Patient verbalized understanding.

## 2025-08-01 PROCEDURE — 88313 SPECIAL STAINS GROUP 2: CPT | Performed by: STUDENT IN AN ORGANIZED HEALTH CARE EDUCATION/TRAINING PROGRAM

## 2025-08-01 PROCEDURE — 88305 TISSUE EXAM BY PATHOLOGIST: CPT | Performed by: STUDENT IN AN ORGANIZED HEALTH CARE EDUCATION/TRAINING PROGRAM

## 2025-08-04 ENCOUNTER — LAB REQUISITION (OUTPATIENT)
Dept: LAB | Facility: HOSPITAL | Age: 20
End: 2025-08-04

## 2025-08-04 DIAGNOSIS — D48.5 NEOPLASM OF UNCERTAIN BEHAVIOR OF SKIN: ICD-10-CM

## 2025-08-11 ENCOUNTER — OFFICE VISIT (OUTPATIENT)
Dept: INTERNAL MEDICINE CLINIC | Facility: CLINIC | Age: 20
End: 2025-08-11

## 2025-08-11 VITALS
HEART RATE: 79 BPM | BODY MASS INDEX: 26.1 KG/M2 | WEIGHT: 156.63 LBS | DIASTOLIC BLOOD PRESSURE: 70 MMHG | TEMPERATURE: 98 F | SYSTOLIC BLOOD PRESSURE: 110 MMHG | HEIGHT: 65 IN | RESPIRATION RATE: 16 BRPM | OXYGEN SATURATION: 97 %

## 2025-08-11 DIAGNOSIS — Z30.011 INITIATION OF ORAL CONTRACEPTION: ICD-10-CM

## 2025-08-11 PROCEDURE — 3074F SYST BP LT 130 MM HG: CPT | Performed by: NURSE PRACTITIONER

## 2025-08-11 PROCEDURE — 3078F DIAST BP <80 MM HG: CPT | Performed by: NURSE PRACTITIONER

## 2025-08-11 PROCEDURE — G2211 COMPLEX E/M VISIT ADD ON: HCPCS | Performed by: NURSE PRACTITIONER

## 2025-08-11 PROCEDURE — 99213 OFFICE O/P EST LOW 20 MIN: CPT | Performed by: NURSE PRACTITIONER

## 2025-08-11 PROCEDURE — 3008F BODY MASS INDEX DOCD: CPT | Performed by: NURSE PRACTITIONER

## 2025-08-11 RX ORDER — NORETHINDRONE ACETATE AND ETHINYL ESTRADIOL 1MG-20(21)
1 KIT ORAL DAILY
Qty: 84 TABLET | Refills: 0 | Status: SHIPPED | OUTPATIENT
Start: 2025-08-11

## 2025-08-14 ENCOUNTER — TELEPHONE (OUTPATIENT)
Dept: INTERNAL MEDICINE CLINIC | Facility: CLINIC | Age: 20
End: 2025-08-14

## 2025-08-20 ENCOUNTER — MED REC SCAN ONLY (OUTPATIENT)
Dept: INTERNAL MEDICINE CLINIC | Facility: CLINIC | Age: 20
End: 2025-08-20

## 2025-08-21 ENCOUNTER — OFFICE VISIT (OUTPATIENT)
Dept: INTERNAL MEDICINE CLINIC | Facility: CLINIC | Age: 20
End: 2025-08-21

## 2025-08-21 ENCOUNTER — TELEPHONE (OUTPATIENT)
Dept: INTERNAL MEDICINE CLINIC | Facility: CLINIC | Age: 20
End: 2025-08-21

## 2025-08-21 VITALS
TEMPERATURE: 98 F | HEART RATE: 80 BPM | HEIGHT: 65 IN | WEIGHT: 156 LBS | BODY MASS INDEX: 25.99 KG/M2 | SYSTOLIC BLOOD PRESSURE: 104 MMHG | DIASTOLIC BLOOD PRESSURE: 60 MMHG | RESPIRATION RATE: 20 BRPM

## 2025-08-21 DIAGNOSIS — N89.8 VAGINAL DISCHARGE: ICD-10-CM

## 2025-08-21 DIAGNOSIS — K64.0 GRADE I HEMORRHOIDS: Primary | ICD-10-CM

## 2025-08-21 PROCEDURE — 99214 OFFICE O/P EST MOD 30 MIN: CPT | Performed by: STUDENT IN AN ORGANIZED HEALTH CARE EDUCATION/TRAINING PROGRAM

## 2025-08-21 PROCEDURE — 3008F BODY MASS INDEX DOCD: CPT | Performed by: STUDENT IN AN ORGANIZED HEALTH CARE EDUCATION/TRAINING PROGRAM

## 2025-08-21 PROCEDURE — G2211 COMPLEX E/M VISIT ADD ON: HCPCS | Performed by: STUDENT IN AN ORGANIZED HEALTH CARE EDUCATION/TRAINING PROGRAM

## 2025-08-21 PROCEDURE — 3074F SYST BP LT 130 MM HG: CPT | Performed by: STUDENT IN AN ORGANIZED HEALTH CARE EDUCATION/TRAINING PROGRAM

## 2025-08-21 PROCEDURE — 3078F DIAST BP <80 MM HG: CPT | Performed by: STUDENT IN AN ORGANIZED HEALTH CARE EDUCATION/TRAINING PROGRAM

## 2025-08-21 PROCEDURE — 81514 NFCT DS BV&VAGINITIS DNA ALG: CPT | Performed by: STUDENT IN AN ORGANIZED HEALTH CARE EDUCATION/TRAINING PROGRAM

## 2025-08-21 RX ORDER — HYDROCORTISONE ACETATE 25 MG/1
25 SUPPOSITORY RECTAL DAILY
Qty: 7 SUPPOSITORY | Refills: 0 | Status: SHIPPED | OUTPATIENT
Start: 2025-08-21

## 2025-08-22 ENCOUNTER — PATIENT MESSAGE (OUTPATIENT)
Dept: INTERNAL MEDICINE CLINIC | Facility: CLINIC | Age: 20
End: 2025-08-22

## 2025-08-22 LAB
BV BACTERIA DNA VAG QL NAA+PROBE: NEGATIVE
C GLABRATA DNA VAG QL NAA+PROBE: NEGATIVE
C KRUSEI DNA VAG QL NAA+PROBE: NEGATIVE
CANDIDA DNA VAG QL NAA+PROBE: POSITIVE
T VAGINALIS DNA VAG QL NAA+PROBE: NEGATIVE

## (undated) NOTE — LETTER
Date: 9/21/2022    Patient Name: Caitlyn Castillo    To Whom it may concern: This letter has been written at the patient's request. The above patient was seen at the [facility] for treatment of a medical condition. This patient should be excused from physical education and/or sports / volleyball until 9/28/2022. She may return to sports / PE on 9/29/2022 if symptoms have resolved by then.     Sincerely,          Britt Callahan MD

## (undated) NOTE — LETTER
VACCINE ADMINISTRATION RECORD  PARENT / GUARDIAN APPROVAL  Date: 2023  Vaccine administered to: Makeda Minaya     : 2005    MRN: [id number]    A copy of the appropriate Centers for Disease Control and Prevention Vaccine Information statement has been provided. I have read or have had explained the information about the diseases and the vaccines listed below. There was an opportunity to ask questions and any questions were answered satisfactorily. I believe that I understand the benefits and risks of the vaccine cited and ask that the vaccine(s) listed below be given to me or to the person named above (for whom I am authorized to make this request). VACCINES ADMINISTERED:  Menveo    I have read and hereby agree to be bound by the terms of this agreement as stated above. My signature is valid until revoked by me in writing. This document is signed by , relationship: Mother on 2023.:                                                                                                                                         Parent / Alexandria Heft                                                Date    Zeenat Torres served as a witness to authentication that the identity of the person signing electronically is in fact the person represented as signing. This document was generated by Gregoria Askew MA on 2023.

## (undated) NOTE — LETTER
Date & Time: 7/4/2025, 6:35 PM  Patient: Priya Corona  Encounter Provider(s):    Ashely Dixon PA       To Whom It May Concern:    Priya Corona was seen and treated in our department on 7/4/2025. She must be able to use crutches and remain in cast shoe until cleared by orthopedist.    If you have any questions or concerns, please do not hesitate to call.        _____________________________  Physician/APC Signature

## (undated) NOTE — LETTER
Date: 1/10/2022    Patient Name: Rupinder Hutchison      To Whom it may concern: This letter has been written at the patient's request. The above patient was seen at the [facility] for treatment of a medical condition.      This patient should b

## (undated) NOTE — ED AVS SNAPSHOT
Parent/Legal Guardian Access to the Online [employer] Record of a Patient 15to 16Years Old  Return completed form by Secure email to [facility]/Medical Records Department: [email or web address]     Requirements and Procedures   Under [facility] MyChart ID and password with another person, that person may be able to view my or my child’s health information, and health information about someone who has authorized me as a MyChart proxy.    ·  I agree that it is my responsibility to select a confident Sign-Up Form and I agree to its terms.        Authorization Form     Please enter Patient’s information below:   Name (last, first, middle initial) __________________________________________   Gender  Male  Female    Last 4 Digits of Social Security Number Parent/Legal Guardian Signature                                  For Patient (1517 years of age)  I agree to allow my parent/legal guardian, named above, online access to my medical information currently available and that may become available as a result

## (undated) NOTE — LETTER
Date: 9/26/2022    Patient Name: Destinee Beauchamp    To Whom it may concern: This letter has been written at the patient's request. She was evaluated in the office on 9/21/2022 for her symptoms. As of today 9/27/2022, her symptoms have completely resolved. She may return to gym / PE / sports as of 9/27/2022, with no limitations.       Sincerely,          Britt Cantu MD

## (undated) NOTE — LETTER
Date: 2021    Patient Name: Anai Camargo  : 2005      To Whom it may concern:    Priya's covid symptoms started on 21. She meets CDC criteria for having completed isolation and may return to school, thank you.     Sincerely,            Me

## (undated) NOTE — LETTER
[facility] [address] [place] [address],[address] [address]  [phone]     Patient: Miya Tellez   YOB: 2005   Date of Visit: 8/29/2021     Dear Vicente Sotelo,      September 1, 2021    At [facility], we are taking spec illness if infected. Thus, it is possible that a person known to be infected could leave isolation earlier than a person who is quarantined because of the possibility they are infected.     Please visit the CDC website for further information and details to
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
## (undated) NOTE — LETTER
Date: 2/21/2019    Patient Name: Kalee Dillard          To Whom it may concern: This letter has been written at the patient's request. The above patient was seen at the [facility] for treatment of a medical condition.     This patient should

## (undated) NOTE — LETTER
[address] [facility] [address] [address]  [phone]              3/4/2020      To the Parents of Carol Ann Vargas.      Reading,  I have been trying to get in touch with you about the m